# Patient Record
Sex: MALE | Race: WHITE | NOT HISPANIC OR LATINO | URBAN - METROPOLITAN AREA
[De-identification: names, ages, dates, MRNs, and addresses within clinical notes are randomized per-mention and may not be internally consistent; named-entity substitution may affect disease eponyms.]

---

## 2021-05-28 ENCOUNTER — RECORDS - HEALTHEAST (OUTPATIENT)
Dept: ADMINISTRATIVE | Facility: CLINIC | Age: 61
End: 2021-05-28

## 2021-06-01 ENCOUNTER — RECORDS - HEALTHEAST (OUTPATIENT)
Dept: ADMINISTRATIVE | Facility: CLINIC | Age: 61
End: 2021-06-01

## 2021-06-16 PROBLEM — F17.200 TOBACCO USE DISORDER: Status: ACTIVE | Noted: 2019-08-29

## 2023-01-01 ENCOUNTER — LAB REQUISITION (OUTPATIENT)
Dept: LAB | Facility: CLINIC | Age: 63
End: 2023-01-01
Payer: COMMERCIAL

## 2023-01-01 DIAGNOSIS — R53.1 WEAKNESS: ICD-10-CM

## 2023-01-01 DIAGNOSIS — J44.9 CHRONIC OBSTRUCTIVE PULMONARY DISEASE, UNSPECIFIED (H): ICD-10-CM

## 2023-01-01 DIAGNOSIS — R53.83 OTHER FATIGUE: ICD-10-CM

## 2023-01-01 LAB
ALBUMIN UR-MCNC: NEGATIVE MG/DL
ANION GAP SERPL CALCULATED.3IONS-SCNC: 15 MMOL/L (ref 7–15)
APPEARANCE UR: CLEAR
BACTERIA UR CULT: NO GROWTH
BASOPHILS # BLD AUTO: 0.1 10E3/UL (ref 0–0.2)
BASOPHILS NFR BLD AUTO: 1 %
BILIRUB UR QL STRIP: NEGATIVE
BUN SERPL-MCNC: 25 MG/DL (ref 8–23)
CALCIUM SERPL-MCNC: 8.7 MG/DL (ref 8.8–10.2)
CHLORIDE SERPL-SCNC: 103 MMOL/L (ref 98–107)
COLOR UR AUTO: NORMAL
CREAT SERPL-MCNC: 1.07 MG/DL (ref 0.67–1.17)
DEPRECATED HCO3 PLAS-SCNC: 23 MMOL/L (ref 22–29)
EOSINOPHIL # BLD AUTO: 0 10E3/UL (ref 0–0.7)
EOSINOPHIL NFR BLD AUTO: 0 %
ERYTHROCYTE [DISTWIDTH] IN BLOOD BY AUTOMATED COUNT: 12.4 % (ref 10–15)
GFR SERPL CREATININE-BSD FRML MDRD: 79 ML/MIN/1.73M2
GLUCOSE SERPL-MCNC: 135 MG/DL (ref 70–99)
GLUCOSE UR STRIP-MCNC: NEGATIVE MG/DL
HCT VFR BLD AUTO: 41.6 % (ref 40–53)
HGB BLD-MCNC: 13.3 G/DL (ref 13.3–17.7)
HGB UR QL STRIP: NEGATIVE
IMM GRANULOCYTES # BLD: 0.1 10E3/UL
IMM GRANULOCYTES NFR BLD: 1 %
KETONES UR STRIP-MCNC: NEGATIVE MG/DL
LEUKOCYTE ESTERASE UR QL STRIP: NEGATIVE
LYMPHOCYTES # BLD AUTO: 1.6 10E3/UL (ref 0.8–5.3)
LYMPHOCYTES NFR BLD AUTO: 18 %
MCH RBC QN AUTO: 32.4 PG (ref 26.5–33)
MCHC RBC AUTO-ENTMCNC: 32 G/DL (ref 31.5–36.5)
MCV RBC AUTO: 101 FL (ref 78–100)
MONOCYTES # BLD AUTO: 1.5 10E3/UL (ref 0–1.3)
MONOCYTES NFR BLD AUTO: 18 %
NEUTROPHILS # BLD AUTO: 5.2 10E3/UL (ref 1.6–8.3)
NEUTROPHILS NFR BLD AUTO: 62 %
NITRATE UR QL: NEGATIVE
NRBC # BLD AUTO: 0 10E3/UL
NRBC BLD AUTO-RTO: 0 /100
NT-PROBNP SERPL-MCNC: 322 PG/ML (ref 0–900)
PH UR STRIP: 5.5 [PH] (ref 5–7)
PLATELET # BLD AUTO: 308 10E3/UL (ref 150–450)
POTASSIUM SERPL-SCNC: 3.8 MMOL/L (ref 3.4–5.3)
RBC # BLD AUTO: 4.11 10E6/UL (ref 4.4–5.9)
RBC URINE: 0 /HPF
SODIUM SERPL-SCNC: 141 MMOL/L (ref 136–145)
SP GR UR STRIP: 1.02 (ref 1–1.03)
SQUAMOUS EPITHELIAL: <1 /HPF
TRANSITIONAL EPI: <1 /HPF
UROBILINOGEN UR STRIP-MCNC: NORMAL MG/DL
WBC # BLD AUTO: 8.4 10E3/UL (ref 4–11)
WBC URINE: 0 /HPF

## 2023-01-01 PROCEDURE — 83880 ASSAY OF NATRIURETIC PEPTIDE: CPT | Mod: ORL | Performed by: NURSE PRACTITIONER

## 2023-01-01 PROCEDURE — 80048 BASIC METABOLIC PNL TOTAL CA: CPT | Mod: ORL | Performed by: NURSE PRACTITIONER

## 2023-01-01 PROCEDURE — 81001 URINALYSIS AUTO W/SCOPE: CPT | Mod: ORL | Performed by: NURSE PRACTITIONER

## 2023-01-01 PROCEDURE — 85025 COMPLETE CBC W/AUTO DIFF WBC: CPT | Mod: ORL | Performed by: NURSE PRACTITIONER

## 2023-01-01 PROCEDURE — 87086 URINE CULTURE/COLONY COUNT: CPT | Mod: ORL | Performed by: NURSE PRACTITIONER

## 2023-01-01 PROCEDURE — P9603 ONE-WAY ALLOW PRORATED MILES: HCPCS | Mod: ORL | Performed by: NURSE PRACTITIONER

## 2023-01-01 PROCEDURE — 36415 COLL VENOUS BLD VENIPUNCTURE: CPT | Mod: ORL | Performed by: NURSE PRACTITIONER

## 2024-01-01 ENCOUNTER — APPOINTMENT (OUTPATIENT)
Dept: NEUROLOGY | Facility: CLINIC | Age: 64
DRG: 003 | End: 2024-01-01
Attending: NURSE PRACTITIONER
Payer: COMMERCIAL

## 2024-01-01 ENCOUNTER — APPOINTMENT (OUTPATIENT)
Dept: GENERAL RADIOLOGY | Facility: CLINIC | Age: 64
DRG: 003 | End: 2024-01-01
Attending: NURSE PRACTITIONER
Payer: COMMERCIAL

## 2024-01-01 ENCOUNTER — APPOINTMENT (OUTPATIENT)
Dept: ULTRASOUND IMAGING | Facility: CLINIC | Age: 64
DRG: 003 | End: 2024-01-01
Attending: NURSE PRACTITIONER
Payer: COMMERCIAL

## 2024-01-01 ENCOUNTER — HOSPITAL ENCOUNTER (INPATIENT)
Facility: CLINIC | Age: 64
LOS: 2 days | DRG: 003 | End: 2024-02-20
Admitting: INTERNAL MEDICINE
Payer: COMMERCIAL

## 2024-01-01 ENCOUNTER — APPOINTMENT (OUTPATIENT)
Dept: CT IMAGING | Facility: CLINIC | Age: 64
DRG: 003 | End: 2024-01-01
Attending: INTERNAL MEDICINE
Payer: COMMERCIAL

## 2024-01-01 ENCOUNTER — APPOINTMENT (OUTPATIENT)
Dept: GENERAL RADIOLOGY | Facility: CLINIC | Age: 64
DRG: 003 | End: 2024-01-01
Attending: STUDENT IN AN ORGANIZED HEALTH CARE EDUCATION/TRAINING PROGRAM
Payer: COMMERCIAL

## 2024-01-01 ENCOUNTER — APPOINTMENT (OUTPATIENT)
Dept: CT IMAGING | Facility: CLINIC | Age: 64
DRG: 003 | End: 2024-01-01
Attending: NURSE PRACTITIONER
Payer: COMMERCIAL

## 2024-01-01 ENCOUNTER — APPOINTMENT (OUTPATIENT)
Dept: CARDIOLOGY | Facility: CLINIC | Age: 64
DRG: 003 | End: 2024-01-01
Attending: NURSE PRACTITIONER
Payer: COMMERCIAL

## 2024-01-01 VITALS
SYSTOLIC BLOOD PRESSURE: 109 MMHG | HEIGHT: 69 IN | TEMPERATURE: 99 F | BODY MASS INDEX: 26.12 KG/M2 | RESPIRATION RATE: 12 BRPM | WEIGHT: 176.37 LBS | DIASTOLIC BLOOD PRESSURE: 96 MMHG | OXYGEN SATURATION: 93 %

## 2024-01-01 DIAGNOSIS — I46.9 CARDIAC ARREST (H): ICD-10-CM

## 2024-01-01 LAB
ABO/RH(D): NORMAL
ABO/RH(D): NORMAL
ACT BLD: 166 SECONDS (ref 74–150)
ACT BLD: 170 SECONDS (ref 74–150)
ACT BLD: 174 SECONDS (ref 74–150)
ACT BLD: 178 SECONDS (ref 74–150)
ACT BLD: 197 SECONDS (ref 74–150)
ACT BLD: 197 SECONDS (ref 74–150)
ACT BLD: 201 SECONDS (ref 74–150)
ACT BLD: 201 SECONDS (ref 74–150)
ACT BLD: 205 SECONDS (ref 74–150)
ACT BLD: 209 SECONDS (ref 74–150)
ACT BLD: 213 SECONDS (ref 74–150)
ACT BLD: 217 SECONDS (ref 74–150)
ACT BLD: 217 SECONDS (ref 74–150)
ACT BLD: 220 SECONDS (ref 74–150)
ACT BLD: 224 SECONDS (ref 74–150)
ACT BLD: 228 SECONDS (ref 74–150)
ACT BLD: 348 SECONDS (ref 74–150)
ALBUMIN SERPL BCG-MCNC: 2.8 G/DL (ref 3.5–5.2)
ALBUMIN SERPL BCG-MCNC: 3 G/DL (ref 3.5–5.2)
ALBUMIN SERPL BCG-MCNC: 3.1 G/DL (ref 3.5–5.2)
ALBUMIN SERPL BCG-MCNC: 3.3 G/DL (ref 3.5–5.2)
ALBUMIN SERPL BCG-MCNC: 3.3 G/DL (ref 3.5–5.2)
ALBUMIN SERPL BCG-MCNC: 3.6 G/DL (ref 3.5–5.2)
ALBUMIN SERPL BCG-MCNC: 3.7 G/DL (ref 3.5–5.2)
ALBUMIN SERPL BCG-MCNC: 3.8 G/DL (ref 3.5–5.2)
ALBUMIN UR-MCNC: 100 MG/DL
ALLEN'S TEST: ABNORMAL
ALP SERPL-CCNC: 101 U/L (ref 40–150)
ALP SERPL-CCNC: 110 U/L (ref 40–150)
ALP SERPL-CCNC: 115 U/L (ref 40–150)
ALP SERPL-CCNC: 139 U/L (ref 40–150)
ALP SERPL-CCNC: 155 U/L (ref 40–150)
ALP SERPL-CCNC: 157 U/L (ref 40–150)
ALP SERPL-CCNC: 165 U/L (ref 40–150)
ALP SERPL-CCNC: 174 U/L (ref 40–150)
ALT SERPL W P-5'-P-CCNC: 19 U/L (ref 0–70)
ALT SERPL W P-5'-P-CCNC: 21 U/L (ref 0–70)
ALT SERPL W P-5'-P-CCNC: 217 U/L (ref 0–70)
ALT SERPL W P-5'-P-CCNC: 22 U/L (ref 0–70)
ALT SERPL W P-5'-P-CCNC: 26 U/L (ref 0–70)
ALT SERPL W P-5'-P-CCNC: 42 U/L (ref 0–70)
ALT SERPL W P-5'-P-CCNC: 58 U/L (ref 0–70)
ALT SERPL W P-5'-P-CCNC: 78 U/L (ref 0–70)
AMPHETAMINES UR QL SCN: ABNORMAL
ANION GAP SERPL CALCULATED.3IONS-SCNC: 14 MMOL/L (ref 7–15)
ANION GAP SERPL CALCULATED.3IONS-SCNC: 17 MMOL/L (ref 7–15)
ANION GAP SERPL CALCULATED.3IONS-SCNC: 19 MMOL/L (ref 7–15)
ANION GAP SERPL CALCULATED.3IONS-SCNC: 20 MMOL/L (ref 7–15)
ANION GAP SERPL CALCULATED.3IONS-SCNC: 27 MMOL/L (ref 7–15)
ANTIBODY SCREEN: NEGATIVE
ANTIBODY SCREEN: NEGATIVE
APPEARANCE UR: CLEAR
APTT PPP: 104 SECONDS (ref 22–38)
APTT PPP: 118 SECONDS (ref 22–38)
APTT PPP: 57 SECONDS (ref 22–38)
APTT PPP: 67 SECONDS (ref 22–38)
APTT PPP: 73 SECONDS (ref 22–38)
APTT PPP: 81 SECONDS (ref 22–38)
APTT PPP: 87 SECONDS (ref 22–38)
APTT PPP: >240 SECONDS (ref 22–38)
AST SERPL W P-5'-P-CCNC: 104 U/L (ref 0–45)
AST SERPL W P-5'-P-CCNC: 156 U/L (ref 0–45)
AST SERPL W P-5'-P-CCNC: 157 U/L (ref 0–45)
AST SERPL W P-5'-P-CCNC: 1578 U/L (ref 0–45)
AST SERPL W P-5'-P-CCNC: 184 U/L (ref 0–45)
AST SERPL W P-5'-P-CCNC: 412 U/L (ref 0–45)
AST SERPL W P-5'-P-CCNC: 692 U/L (ref 0–45)
AST SERPL W P-5'-P-CCNC: 988 U/L (ref 0–45)
AT III ACT/NOR PPP CHRO: 47 % (ref 85–135)
AT III ACT/NOR PPP CHRO: 56 % (ref 85–135)
ATRIAL RATE - MUSE: 107 BPM
BARBITURATES UR QL SCN: ABNORMAL
BASE EXCESS BLDA CALC-SCNC: -1.1 MMOL/L (ref -3–3)
BASE EXCESS BLDA CALC-SCNC: -1.2 MMOL/L (ref -3–3)
BASE EXCESS BLDA CALC-SCNC: -2.5 MMOL/L (ref -3–3)
BASE EXCESS BLDA CALC-SCNC: -2.7 MMOL/L (ref -3–3)
BASE EXCESS BLDA CALC-SCNC: -3.1 MMOL/L (ref -3–3)
BASE EXCESS BLDA CALC-SCNC: 2.9 MMOL/L (ref -3–3)
BASE EXCESS BLDA CALC-SCNC: 3.1 MMOL/L (ref -3–3)
BASE EXCESS BLDA CALC-SCNC: 3.1 MMOL/L (ref -3–3)
BASE EXCESS BLDA CALC-SCNC: 3.2 MMOL/L (ref -3–3)
BASE EXCESS BLDA CALC-SCNC: 4 MMOL/L (ref -3–3)
BASE EXCESS BLDA CALC-SCNC: 4.4 MMOL/L (ref -3–3)
BASE EXCESS BLDA CALC-SCNC: 4.9 MMOL/L (ref -3–3)
BASE EXCESS BLDA CALC-SCNC: 5.5 MMOL/L (ref -3–3)
BASE EXCESS BLDA CALC-SCNC: 6.7 MMOL/L (ref -3–3)
BASE EXCESS BLDA CALC-SCNC: 7.1 MMOL/L (ref -3–3)
BASE EXCESS BLDA CALC-SCNC: 7.1 MMOL/L (ref -3–3)
BASE EXCESS BLDA CALC-SCNC: 7.3 MMOL/L (ref -3–3)
BASE EXCESS BLDV CALC-SCNC: -0.4 MMOL/L (ref -3–3)
BASE EXCESS BLDV CALC-SCNC: 3.3 MMOL/L (ref -3–3)
BASE EXCESS BLDV CALC-SCNC: 3.8 MMOL/L (ref -3–3)
BASE EXCESS BLDV CALC-SCNC: 5.1 MMOL/L (ref -3–3)
BASE EXCESS BLDV CALC-SCNC: 5.8 MMOL/L (ref -3–3)
BASOPHILS # BLD AUTO: 0.1 10E3/UL (ref 0–0.2)
BASOPHILS NFR BLD AUTO: 0 %
BENZODIAZ UR QL SCN: ABNORMAL
BILIRUB SERPL-MCNC: 0.6 MG/DL
BILIRUB SERPL-MCNC: 0.6 MG/DL
BILIRUB SERPL-MCNC: 0.8 MG/DL
BILIRUB SERPL-MCNC: 1.1 MG/DL
BILIRUB SERPL-MCNC: 1.6 MG/DL
BILIRUB SERPL-MCNC: 1.8 MG/DL
BILIRUB SERPL-MCNC: 2 MG/DL
BILIRUB SERPL-MCNC: 2.6 MG/DL
BILIRUB UR QL STRIP: NEGATIVE
BLD PROD TYP BPU: NORMAL
BLOOD COMPONENT TYPE: NORMAL
BUN SERPL-MCNC: 11.1 MG/DL (ref 8–23)
BUN SERPL-MCNC: 16.9 MG/DL (ref 8–23)
BUN SERPL-MCNC: 21.1 MG/DL (ref 8–23)
BUN SERPL-MCNC: 24.6 MG/DL (ref 8–23)
BUN SERPL-MCNC: 27.6 MG/DL (ref 8–23)
BUN SERPL-MCNC: 30.8 MG/DL (ref 8–23)
BUN SERPL-MCNC: 36.5 MG/DL (ref 8–23)
BUN SERPL-MCNC: 40.2 MG/DL (ref 8–23)
BZE UR QL SCN: ABNORMAL
CA-I BLD-MCNC: 3.9 MG/DL (ref 4.4–5.2)
CA-I BLD-MCNC: 4 MG/DL (ref 4.4–5.2)
CA-I BLD-MCNC: 4 MG/DL (ref 4.4–5.2)
CA-I BLD-MCNC: 4.1 MG/DL (ref 4.4–5.2)
CA-I BLD-MCNC: 4.4 MG/DL (ref 4.4–5.2)
CA-I BLD-MCNC: 4.6 MG/DL (ref 4.4–5.2)
CA-I BLD-MCNC: 4.7 MG/DL (ref 4.4–5.2)
CA-I BLD-MCNC: 4.8 MG/DL (ref 4.4–5.2)
CALCIUM SERPL-MCNC: 8 MG/DL (ref 8.8–10.2)
CALCIUM SERPL-MCNC: 8.2 MG/DL (ref 8.8–10.2)
CALCIUM SERPL-MCNC: 8.4 MG/DL (ref 8.2–9.6)
CALCIUM SERPL-MCNC: 8.5 MG/DL (ref 8.8–10.2)
CALCIUM SERPL-MCNC: 8.6 MG/DL (ref 8.8–10.2)
CALCIUM SERPL-MCNC: 8.9 MG/DL (ref 8.8–10.2)
CALCIUM SERPL-MCNC: 9.2 MG/DL (ref 8.8–10.2)
CALCIUM SERPL-MCNC: 9.5 MG/DL (ref 8.8–10.2)
CANNABINOIDS UR QL SCN: ABNORMAL
CF REDUC 30M P MA P HEP LENFR BLD TEG: 0 % (ref 0–8)
CF REDUC 60M P MA P HEPASE LENFR BLD TEG: 0 % (ref 0–15)
CFT P HPASE BLD TEG: 1.2 MINUTE (ref 1–3)
CFT P HPASE BLD TEG: 2.6 MINUTE (ref 1–3)
CFT P HPASE BLD TEG: 3.2 MINUTE (ref 1–3)
CHLORIDE SERPL-SCNC: 100 MMOL/L (ref 98–107)
CHLORIDE SERPL-SCNC: 101 MMOL/L (ref 98–107)
CHLORIDE SERPL-SCNC: 103 MMOL/L (ref 98–107)
CHLORIDE SERPL-SCNC: 92 MMOL/L (ref 98–107)
CHLORIDE SERPL-SCNC: 98 MMOL/L (ref 98–107)
CHLORIDE SERPL-SCNC: 99 MMOL/L (ref 98–107)
CI (COAGULATION INDEX)(Z): -2.1 (ref -3–3)
CI (COAGULATION INDEX)(Z): -5.3 (ref -3–3)
CI (COAGULATION INDEX)(Z): 3 (ref -3–3)
CLOT ANGLE P HPASE BLD TEG: 53.9 DEGREES (ref 53–72)
CLOT ANGLE P HPASE BLD TEG: 57.5 DEGREES (ref 53–72)
CLOT ANGLE P HPASE BLD TEG: 73.4 DEGREES (ref 53–72)
CLOT INIT P HPASE BLD TEG: 13.8 MINUTE (ref 5–10)
CLOT INIT P HPASE BLD TEG: 5.2 MINUTE (ref 5–10)
CLOT INIT P HPASE BLD TEG: 9.6 MINUTE (ref 5–10)
CLOT STRENGTH P HPASE BLD TEG: 11.6 KD/SC (ref 4.5–11)
CLOT STRENGTH P HPASE BLD TEG: 12 KD/SC (ref 4.5–11)
CLOT STRENGTH P HPASE BLD TEG: 13.8 KD/SC (ref 4.5–11)
CODING SYSTEM: NORMAL
COHGB MFR BLD: 93 % (ref 96–97)
COHGB MFR BLD: 98.3 % (ref 96–97)
COHGB MFR BLD: 98.9 % (ref 96–97)
COHGB MFR BLD: 99.2 % (ref 96–97)
COHGB MFR BLD: 99.6 % (ref 96–97)
COHGB MFR BLD: >100 % (ref 96–97)
COLOR UR AUTO: YELLOW
CORTIS SERPL-MCNC: 27.7 UG/DL
CREAT SERPL-MCNC: 0.59 MG/DL (ref 0.67–1.17)
CREAT SERPL-MCNC: 0.66 MG/DL (ref 0.67–1.17)
CREAT SERPL-MCNC: 0.79 MG/DL (ref 0.67–1.17)
CREAT SERPL-MCNC: 0.91 MG/DL (ref 0.67–1.17)
CREAT SERPL-MCNC: 1.17 MG/DL (ref 0.67–1.17)
CREAT SERPL-MCNC: 1.29 MG/DL (ref 0.67–1.17)
CREAT SERPL-MCNC: 1.46 MG/DL (ref 0.67–1.17)
CREAT SERPL-MCNC: 1.73 MG/DL (ref 0.67–1.17)
CROSSMATCH: NORMAL
CRP SERPL-MCNC: 248 MG/L
CRP SERPL-MCNC: 32.6 MG/L
CRP SERPL-MCNC: 56.7 MG/L
D DIMER PPP FEU-MCNC: 12.33 UG/ML FEU (ref 0–0.5)
D DIMER PPP FEU-MCNC: 5.25 UG/ML FEU (ref 0–0.5)
D DIMER PPP FEU-MCNC: 6 UG/ML FEU (ref 0–0.5)
D DIMER PPP FEU-MCNC: >20 UG/ML FEU (ref 0–0.5)
DEPRECATED HCO3 PLAS-SCNC: 20 MMOL/L (ref 22–29)
DEPRECATED HCO3 PLAS-SCNC: 22 MMOL/L (ref 22–29)
DEPRECATED HCO3 PLAS-SCNC: 24 MMOL/L (ref 22–29)
DEPRECATED HCO3 PLAS-SCNC: 24 MMOL/L (ref 22–29)
DEPRECATED HCO3 PLAS-SCNC: 25 MMOL/L (ref 22–29)
DEPRECATED HCO3 PLAS-SCNC: 27 MMOL/L (ref 22–29)
DEPRECATED HCO3 PLAS-SCNC: 28 MMOL/L (ref 22–29)
DEPRECATED HCO3 PLAS-SCNC: 29 MMOL/L (ref 22–29)
DIASTOLIC BLOOD PRESSURE - MUSE: NORMAL MMHG
EGFRCR SERPLBLD CKD-EPI 2021: 44 ML/MIN/1.73M2
EGFRCR SERPLBLD CKD-EPI 2021: 54 ML/MIN/1.73M2
EGFRCR SERPLBLD CKD-EPI 2021: 63 ML/MIN/1.73M2
EGFRCR SERPLBLD CKD-EPI 2021: 71 ML/MIN/1.73M2
EGFRCR SERPLBLD CKD-EPI 2021: 72 ML/MIN/1.73M2
EGFRCR SERPLBLD CKD-EPI 2021: >90 ML/MIN/1.73M2
EOSINOPHIL # BLD AUTO: 0.1 10E3/UL (ref 0–0.7)
EOSINOPHIL NFR BLD AUTO: 0 %
ERYTHROCYTE [DISTWIDTH] IN BLOOD BY AUTOMATED COUNT: 19.1 % (ref 10–15)
ERYTHROCYTE [DISTWIDTH] IN BLOOD BY AUTOMATED COUNT: 19.3 % (ref 10–15)
ERYTHROCYTE [DISTWIDTH] IN BLOOD BY AUTOMATED COUNT: 19.4 % (ref 10–15)
ERYTHROCYTE [DISTWIDTH] IN BLOOD BY AUTOMATED COUNT: 19.4 % (ref 10–15)
ERYTHROCYTE [DISTWIDTH] IN BLOOD BY AUTOMATED COUNT: 19.8 % (ref 10–15)
ERYTHROCYTE [DISTWIDTH] IN BLOOD BY AUTOMATED COUNT: 19.9 % (ref 10–15)
ERYTHROCYTE [DISTWIDTH] IN BLOOD BY AUTOMATED COUNT: 20.4 % (ref 10–15)
ERYTHROCYTE [DISTWIDTH] IN BLOOD BY AUTOMATED COUNT: 20.6 % (ref 10–15)
ERYTHROCYTE [SEDIMENTATION RATE] IN BLOOD BY WESTERGREN METHOD: 43 MM/HR (ref 0–20)
ERYTHROCYTE [SEDIMENTATION RATE] IN BLOOD BY WESTERGREN METHOD: 74 MM/HR (ref 0–20)
ERYTHROCYTE [SEDIMENTATION RATE] IN BLOOD BY WESTERGREN METHOD: 84 MM/HR (ref 0–20)
FENTANYL UR QL: ABNORMAL
FIBRINOGEN PPP-MCNC: 423 MG/DL (ref 170–490)
FIBRINOGEN PPP-MCNC: 477 MG/DL (ref 170–490)
FIBRINOGEN PPP-MCNC: 535 MG/DL (ref 170–490)
FIBRINOGEN PPP-MCNC: 618 MG/DL (ref 170–490)
FLUAV RNA SPEC QL NAA+PROBE: NEGATIVE
FLUBV RNA RESP QL NAA+PROBE: NEGATIVE
GLUCOSE BLD-MCNC: 122 MG/DL (ref 70–99)
GLUCOSE BLD-MCNC: 145 MG/DL (ref 70–99)
GLUCOSE BLD-MCNC: 152 MG/DL (ref 70–99)
GLUCOSE BLD-MCNC: 206 MG/DL (ref 70–99)
GLUCOSE BLD-MCNC: 212 MG/DL (ref 70–99)
GLUCOSE BLD-MCNC: 230 MG/DL (ref 70–99)
GLUCOSE BLD-MCNC: 329 MG/DL (ref 70–99)
GLUCOSE BLD-MCNC: 412 MG/DL (ref 70–99)
GLUCOSE BLD-MCNC: 97 MG/DL (ref 70–99)
GLUCOSE BLDC GLUCOMTR-MCNC: 105 MG/DL (ref 70–99)
GLUCOSE BLDC GLUCOMTR-MCNC: 109 MG/DL (ref 70–99)
GLUCOSE BLDC GLUCOMTR-MCNC: 114 MG/DL (ref 70–99)
GLUCOSE BLDC GLUCOMTR-MCNC: 115 MG/DL (ref 70–99)
GLUCOSE BLDC GLUCOMTR-MCNC: 117 MG/DL (ref 70–99)
GLUCOSE BLDC GLUCOMTR-MCNC: 121 MG/DL (ref 70–99)
GLUCOSE BLDC GLUCOMTR-MCNC: 126 MG/DL (ref 70–99)
GLUCOSE BLDC GLUCOMTR-MCNC: 127 MG/DL (ref 70–99)
GLUCOSE BLDC GLUCOMTR-MCNC: 127 MG/DL (ref 70–99)
GLUCOSE BLDC GLUCOMTR-MCNC: 134 MG/DL (ref 70–99)
GLUCOSE BLDC GLUCOMTR-MCNC: 146 MG/DL (ref 70–99)
GLUCOSE BLDC GLUCOMTR-MCNC: 147 MG/DL (ref 70–99)
GLUCOSE BLDC GLUCOMTR-MCNC: 153 MG/DL (ref 70–99)
GLUCOSE BLDC GLUCOMTR-MCNC: 159 MG/DL (ref 70–99)
GLUCOSE BLDC GLUCOMTR-MCNC: 175 MG/DL (ref 70–99)
GLUCOSE BLDC GLUCOMTR-MCNC: 176 MG/DL (ref 70–99)
GLUCOSE BLDC GLUCOMTR-MCNC: 182 MG/DL (ref 70–99)
GLUCOSE BLDC GLUCOMTR-MCNC: 183 MG/DL (ref 70–99)
GLUCOSE BLDC GLUCOMTR-MCNC: 191 MG/DL (ref 70–99)
GLUCOSE BLDC GLUCOMTR-MCNC: 193 MG/DL (ref 70–99)
GLUCOSE BLDC GLUCOMTR-MCNC: 194 MG/DL (ref 70–99)
GLUCOSE BLDC GLUCOMTR-MCNC: 195 MG/DL (ref 70–99)
GLUCOSE BLDC GLUCOMTR-MCNC: 204 MG/DL (ref 70–99)
GLUCOSE BLDC GLUCOMTR-MCNC: 216 MG/DL (ref 70–99)
GLUCOSE BLDC GLUCOMTR-MCNC: 233 MG/DL (ref 70–99)
GLUCOSE BLDC GLUCOMTR-MCNC: 269 MG/DL (ref 70–99)
GLUCOSE BLDC GLUCOMTR-MCNC: 271 MG/DL (ref 70–99)
GLUCOSE BLDC GLUCOMTR-MCNC: 274 MG/DL (ref 70–99)
GLUCOSE BLDC GLUCOMTR-MCNC: 95 MG/DL (ref 70–99)
GLUCOSE SERPL-MCNC: 102 MG/DL (ref 70–99)
GLUCOSE SERPL-MCNC: 127 MG/DL (ref 70–99)
GLUCOSE SERPL-MCNC: 146 MG/DL (ref 70–99)
GLUCOSE SERPL-MCNC: 161 MG/DL (ref 70–99)
GLUCOSE SERPL-MCNC: 220 MG/DL (ref 70–99)
GLUCOSE SERPL-MCNC: 230 MG/DL (ref 70–99)
GLUCOSE SERPL-MCNC: 242 MG/DL (ref 70–99)
GLUCOSE SERPL-MCNC: 316 MG/DL (ref 70–99)
GLUCOSE UR STRIP-MCNC: 150 MG/DL
HBA1C MFR BLD: 7.2 %
HCO3 BLD-SCNC: 22 MMOL/L (ref 21–28)
HCO3 BLD-SCNC: 23 MMOL/L (ref 21–28)
HCO3 BLD-SCNC: 24 MMOL/L (ref 21–28)
HCO3 BLD-SCNC: 24 MMOL/L (ref 21–28)
HCO3 BLD-SCNC: 27 MMOL/L (ref 21–28)
HCO3 BLD-SCNC: 27 MMOL/L (ref 21–28)
HCO3 BLD-SCNC: 28 MMOL/L (ref 21–28)
HCO3 BLD-SCNC: 30 MMOL/L (ref 21–28)
HCO3 BLD-SCNC: 30 MMOL/L (ref 21–28)
HCO3 BLD-SCNC: 31 MMOL/L (ref 21–28)
HCO3 BLDA-SCNC: 24 MMOL/L (ref 21–28)
HCO3 BLDA-SCNC: 27 MMOL/L (ref 21–28)
HCO3 BLDA-SCNC: 27 MMOL/L (ref 21–28)
HCO3 BLDA-SCNC: 28 MMOL/L (ref 21–28)
HCO3 BLDV-SCNC: 25 MMOL/L (ref 21–28)
HCO3 BLDV-SCNC: 29 MMOL/L (ref 21–28)
HCO3 BLDV-SCNC: 29 MMOL/L (ref 21–28)
HCO3 BLDV-SCNC: 31 MMOL/L (ref 21–28)
HCO3 BLDV-SCNC: 32 MMOL/L (ref 21–28)
HCT VFR BLD AUTO: 20.3 % (ref 40–53)
HCT VFR BLD AUTO: 21 % (ref 40–53)
HCT VFR BLD AUTO: 21.5 % (ref 40–53)
HCT VFR BLD AUTO: 22.4 % (ref 40–53)
HCT VFR BLD AUTO: 24 % (ref 40–53)
HCT VFR BLD AUTO: 24.7 % (ref 40–53)
HCT VFR BLD AUTO: 25 % (ref 40–53)
HCT VFR BLD AUTO: 35.2 % (ref 40–53)
HGB BLD-MCNC: 10.8 G/DL (ref 13.3–17.7)
HGB BLD-MCNC: 12.4 G/DL (ref 13.3–17.7)
HGB BLD-MCNC: 6.4 G/DL (ref 13.3–17.7)
HGB BLD-MCNC: 6.4 G/DL (ref 13.3–17.7)
HGB BLD-MCNC: 6.9 G/DL (ref 13.3–17.7)
HGB BLD-MCNC: 7 G/DL (ref 13.3–17.7)
HGB BLD-MCNC: 7.1 G/DL (ref 13.3–17.7)
HGB BLD-MCNC: 7.7 G/DL (ref 13.3–17.7)
HGB BLD-MCNC: 8.2 G/DL (ref 13.3–17.7)
HGB BLD-MCNC: 8.4 G/DL (ref 13.3–17.7)
HGB FREE PLAS-MCNC: 30 MG/DL
HGB FREE PLAS-MCNC: 30 MG/DL
HGB FREE PLAS-MCNC: <30 MG/DL
HGB UR QL STRIP: ABNORMAL
IMM GRANULOCYTES # BLD: 0.5 10E3/UL
IMM GRANULOCYTES NFR BLD: 2 %
INR PPP: 1.76 (ref 0.85–1.15)
INR PPP: 2.03 (ref 0.85–1.15)
INR PPP: 2.05 (ref 0.85–1.15)
INR PPP: 2.11 (ref 0.85–1.15)
INR PPP: 2.16 (ref 0.85–1.15)
INR PPP: 2.22 (ref 0.85–1.15)
INR PPP: 2.73 (ref 0.85–1.15)
INR PPP: 2.8 (ref 0.85–1.15)
INTERPRETATION ECG - MUSE: NORMAL
INTERPRETATION TEGPIA: NORMAL
INTERPRETATION TEGPIA: NORMAL
ISSUE DATE AND TIME: NORMAL
KETONES UR STRIP-MCNC: NEGATIVE MG/DL
LACTATE BLD-SCNC: 16 MMOL/L (ref 0.7–2)
LACTATE SERPL-SCNC: 11.1 MMOL/L (ref 0.7–2)
LACTATE SERPL-SCNC: 4.3 MMOL/L (ref 0.7–2)
LACTATE SERPL-SCNC: 4.8 MMOL/L (ref 0.7–2)
LACTATE SERPL-SCNC: 5 MMOL/L (ref 0.7–2)
LACTATE SERPL-SCNC: 5.4 MMOL/L (ref 0.7–2)
LACTATE SERPL-SCNC: 5.5 MMOL/L (ref 0.7–2)
LACTATE SERPL-SCNC: 5.5 MMOL/L (ref 0.7–2)
LACTATE SERPL-SCNC: 5.6 MMOL/L (ref 0.7–2)
LACTATE SERPL-SCNC: 5.8 MMOL/L (ref 0.7–2)
LACTATE SERPL-SCNC: 6.2 MMOL/L (ref 0.7–2)
LACTATE SERPL-SCNC: 6.3 MMOL/L (ref 0.7–2)
LACTATE SERPL-SCNC: 6.4 MMOL/L (ref 0.7–2)
LACTATE SERPL-SCNC: 6.5 MMOL/L (ref 0.7–2)
LACTATE SERPL-SCNC: 6.6 MMOL/L (ref 0.7–2)
LACTATE SERPL-SCNC: 7 MMOL/L (ref 0.7–2)
LACTATE SERPL-SCNC: 7.5 MMOL/L (ref 0.7–2)
LACTATE SERPL-SCNC: 7.6 MMOL/L (ref 0.7–2)
LACTATE SERPL-SCNC: 8 MMOL/L (ref 0.7–2)
LACTATE SERPL-SCNC: 8.1 MMOL/L (ref 0.7–2)
LDH SERPL L TO P-CCNC: 1649 U/L (ref 0–250)
LDH SERPL L TO P-CCNC: 526 U/L (ref 0–250)
LDH SERPL L TO P-CCNC: 640 U/L (ref 0–250)
LEUKOCYTE ESTERASE UR QL STRIP: NEGATIVE
LYMPHOCYTES # BLD AUTO: 2.2 10E3/UL (ref 0.8–5.3)
LYMPHOCYTES NFR BLD AUTO: 8 %
MAGNESIUM SERPL-MCNC: 2.2 MG/DL (ref 1.7–2.3)
MAGNESIUM SERPL-MCNC: 2.4 MG/DL (ref 1.7–2.3)
MAGNESIUM SERPL-MCNC: 2.8 MG/DL (ref 1.7–2.3)
MAGNESIUM SERPL-MCNC: 3.2 MG/DL (ref 1.7–2.3)
MAGNESIUM SERPL-MCNC: 3.6 MG/DL (ref 1.7–2.3)
MAGNESIUM SERPL-MCNC: 3.6 MG/DL (ref 1.7–2.3)
MAGNESIUM SERPL-MCNC: 3.7 MG/DL (ref 1.7–2.3)
MAGNESIUM SERPL-MCNC: 4.5 MG/DL (ref 1.7–2.3)
MCF P HPASE BLD TEG: 69.9 MM (ref 50–70)
MCF P HPASE BLD TEG: 70.6 MM (ref 50–70)
MCF P HPASE BLD TEG: 73.4 MM (ref 50–70)
MCH RBC QN AUTO: 26 PG (ref 26.5–33)
MCH RBC QN AUTO: 26 PG (ref 26.5–33)
MCH RBC QN AUTO: 26.6 PG (ref 26.5–33)
MCH RBC QN AUTO: 26.7 PG (ref 26.5–33)
MCH RBC QN AUTO: 26.7 PG (ref 26.5–33)
MCH RBC QN AUTO: 27 PG (ref 26.5–33)
MCH RBC QN AUTO: 27.1 PG (ref 26.5–33)
MCH RBC QN AUTO: 28.1 PG (ref 26.5–33)
MCHC RBC AUTO-ENTMCNC: 30.7 G/DL (ref 31.5–36.5)
MCHC RBC AUTO-ENTMCNC: 31.5 G/DL (ref 31.5–36.5)
MCHC RBC AUTO-ENTMCNC: 31.7 G/DL (ref 31.5–36.5)
MCHC RBC AUTO-ENTMCNC: 32.1 G/DL (ref 31.5–36.5)
MCHC RBC AUTO-ENTMCNC: 32.6 G/DL (ref 31.5–36.5)
MCHC RBC AUTO-ENTMCNC: 32.9 G/DL (ref 31.5–36.5)
MCHC RBC AUTO-ENTMCNC: 33.2 G/DL (ref 31.5–36.5)
MCHC RBC AUTO-ENTMCNC: 33.6 G/DL (ref 31.5–36.5)
MCV RBC AUTO: 80 FL (ref 78–100)
MCV RBC AUTO: 81 FL (ref 78–100)
MCV RBC AUTO: 82 FL (ref 78–100)
MCV RBC AUTO: 82 FL (ref 78–100)
MCV RBC AUTO: 83 FL (ref 78–100)
MCV RBC AUTO: 85 FL (ref 78–100)
MCV RBC AUTO: 85 FL (ref 78–100)
MCV RBC AUTO: 87 FL (ref 78–100)
MONOCYTES # BLD AUTO: 1.3 10E3/UL (ref 0–1.3)
MONOCYTES NFR BLD AUTO: 5 %
MRSA DNA SPEC QL NAA+PROBE: NEGATIVE
MUCOUS THREADS #/AREA URNS LPF: PRESENT /LPF
NEUTROPHILS # BLD AUTO: 23.8 10E3/UL (ref 1.6–8.3)
NEUTROPHILS NFR BLD AUTO: 85 %
NITRATE UR QL: NEGATIVE
NRBC # BLD AUTO: 0.1 10E3/UL
NRBC BLD AUTO-RTO: 0 /100
NSE SERPL IA-MCNC: 118.3 NG/ML
NSE SERPL IA-MCNC: 54.6 NG/ML
O2/TOTAL GAS SETTING VFR VENT: 100 %
O2/TOTAL GAS SETTING VFR VENT: 40 %
O2/TOTAL GAS SETTING VFR VENT: 50 %
O2/TOTAL GAS SETTING VFR VENT: 60 %
O2/TOTAL GAS SETTING VFR VENT: 80 %
OPIATES UR QL SCN: ABNORMAL
OXYHGB MFR BLDA: 95 % (ref 92–100)
OXYHGB MFR BLDA: 96 % (ref 75–100)
OXYHGB MFR BLDA: 97 % (ref 75–100)
OXYHGB MFR BLDA: 98 % (ref 75–100)
OXYHGB MFR BLDA: 99 % (ref 75–100)
OXYHGB MFR BLDV: 24 % (ref 70–75)
OXYHGB MFR BLDV: 59 %
OXYHGB MFR BLDV: 67 %
OXYHGB MFR BLDV: 67 %
OXYHGB MFR BLDV: 72 %
P AXIS - MUSE: 75 DEGREES
PA AA BLD-ACNC: 100 %
PA AA BLD-ACNC: 100 %
PA ADP BLD-ACNC: 100 %
PA ADP BLD-ACNC: 97 %
PCO2 BLD: 33 MM HG (ref 35–45)
PCO2 BLD: 34 MM HG (ref 35–45)
PCO2 BLD: 34 MM HG (ref 35–45)
PCO2 BLD: 36 MM HG (ref 35–45)
PCO2 BLD: 37 MM HG (ref 35–45)
PCO2 BLD: 38 MM HG (ref 35–45)
PCO2 BLD: 40 MM HG (ref 35–45)
PCO2 BLD: 40 MM HG (ref 35–45)
PCO2 BLD: 42 MM HG (ref 35–45)
PCO2 BLD: 42 MM HG (ref 35–45)
PCO2 BLD: 44 MM HG (ref 35–45)
PCO2 BLD: 48 MM HG (ref 35–45)
PCO2 BLD: 51 MM HG (ref 35–45)
PCO2 BLDA: 34 MM HG (ref 35–45)
PCO2 BLDA: 36 MM HG (ref 35–45)
PCO2 BLDA: 37 MM HG (ref 35–45)
PCO2 BLDA: 40 MM HG (ref 35–45)
PCO2 BLDA: >98 MM HG (ref 35–45)
PCO2 BLDV: 41 MM HG (ref 40–50)
PCO2 BLDV: 43 MM HG (ref 40–50)
PCO2 BLDV: 46 MM HG (ref 40–50)
PCO2 BLDV: 47 MM HG (ref 40–50)
PCO2 BLDV: 71 MM HG (ref 40–50)
PCP QUAL URINE (ROCHE): ABNORMAL
PEEP: 7 CM H2O
PH BLD: 7.31 [PH] (ref 7.35–7.45)
PH BLD: 7.34 [PH] (ref 7.35–7.45)
PH BLD: 7.35 [PH] (ref 7.35–7.45)
PH BLD: 7.38 [PH] (ref 7.35–7.45)
PH BLD: 7.39 [PH] (ref 7.35–7.45)
PH BLD: 7.43 [PH] (ref 7.35–7.45)
PH BLD: 7.46 [PH] (ref 7.35–7.45)
PH BLD: 7.47 [PH] (ref 7.35–7.45)
PH BLD: 7.48 [PH] (ref 7.35–7.45)
PH BLD: 7.49 [PH] (ref 7.35–7.45)
PH BLD: 7.5 [PH] (ref 7.35–7.45)
PH BLD: 7.51 [PH] (ref 7.35–7.45)
PH BLD: 7.52 [PH] (ref 7.35–7.45)
PH BLD: 7.56 [PH] (ref 7.35–7.45)
PH BLD: 7.57 [PH] (ref 7.35–7.45)
PH BLDA: 6.91 [PH] (ref 7.35–7.45)
PH BLDA: 7.39 [PH] (ref 7.35–7.45)
PH BLDA: 7.47 [PH] (ref 7.35–7.45)
PH BLDA: 7.5 [PH] (ref 7.35–7.45)
PH BLDA: 7.52 [PH] (ref 7.35–7.45)
PH BLDV: 7.26 [PH] (ref 7.32–7.43)
PH BLDV: 7.34 [PH] (ref 7.32–7.43)
PH BLDV: 7.43 [PH] (ref 7.32–7.43)
PH BLDV: 7.43 [PH] (ref 7.32–7.43)
PH BLDV: 7.46 [PH] (ref 7.32–7.43)
PH UR STRIP: 6.5 [PH] (ref 5–7)
PHOSPHATE SERPL-MCNC: 5 MG/DL (ref 2.5–4.5)
PHOSPHATE SERPL-MCNC: 9.9 MG/DL (ref 2.5–4.5)
PLATELET # BLD AUTO: 112 10E3/UL (ref 150–450)
PLATELET # BLD AUTO: 133 10E3/UL (ref 150–450)
PLATELET # BLD AUTO: 143 10E3/UL (ref 150–450)
PLATELET # BLD AUTO: 177 10E3/UL (ref 150–450)
PLATELET # BLD AUTO: 189 10E3/UL (ref 150–450)
PLATELET # BLD AUTO: 209 10E3/UL (ref 150–450)
PLATELET # BLD AUTO: 268 10E3/UL (ref 150–450)
PLATELET # BLD AUTO: 88 10E3/UL (ref 150–450)
PO2 BLD: 112 MM HG (ref 80–105)
PO2 BLD: 112 MM HG (ref 80–105)
PO2 BLD: 118 MM HG (ref 80–105)
PO2 BLD: 147 MM HG (ref 80–105)
PO2 BLD: 151 MM HG (ref 80–105)
PO2 BLD: 158 MM HG (ref 80–105)
PO2 BLD: 206 MM HG (ref 80–105)
PO2 BLD: 248 MM HG (ref 80–105)
PO2 BLD: 280 MM HG (ref 80–105)
PO2 BLD: 289 MM HG (ref 80–105)
PO2 BLD: 298 MM HG (ref 80–105)
PO2 BLD: 352 MM HG (ref 80–105)
PO2 BLD: 355 MM HG (ref 80–105)
PO2 BLD: 361 MM HG (ref 80–105)
PO2 BLD: 66 MM HG (ref 80–105)
PO2 BLDA: 170 MM HG (ref 80–105)
PO2 BLDA: 179 MM HG (ref 80–105)
PO2 BLDA: 193 MM HG (ref 80–105)
PO2 BLDA: 300 MM HG (ref 80–105)
PO2 BLDA: 387 MM HG (ref 80–105)
PO2 BLDV: 22 MM HG (ref 25–47)
PO2 BLDV: 35 MM HG (ref 25–47)
PO2 BLDV: 37 MM HG (ref 25–47)
PO2 BLDV: 40 MM HG (ref 25–47)
PO2 BLDV: 41 MM HG (ref 25–47)
POTASSIUM BLD-SCNC: 2.5 MMOL/L (ref 3.4–5.3)
POTASSIUM BLD-SCNC: 2.5 MMOL/L (ref 3.4–5.3)
POTASSIUM BLD-SCNC: 6.4 MMOL/L (ref 3.4–5.3)
POTASSIUM BLD-SCNC: 6.5 MMOL/L (ref 3.4–5.3)
POTASSIUM BLD-SCNC: 7 MMOL/L (ref 3.4–5.3)
POTASSIUM SERPL-SCNC: 2.8 MMOL/L (ref 3.4–5.3)
POTASSIUM SERPL-SCNC: 3.1 MMOL/L (ref 3.4–5.3)
POTASSIUM SERPL-SCNC: 3.7 MMOL/L (ref 3.4–5.3)
POTASSIUM SERPL-SCNC: 4.4 MMOL/L (ref 3.4–5.3)
POTASSIUM SERPL-SCNC: 5.7 MMOL/L (ref 3.4–5.3)
POTASSIUM SERPL-SCNC: 5.8 MMOL/L (ref 3.4–5.3)
POTASSIUM SERPL-SCNC: 6 MMOL/L (ref 3.4–5.3)
POTASSIUM SERPL-SCNC: 6 MMOL/L (ref 3.4–5.3)
POTASSIUM SERPL-SCNC: 6.2 MMOL/L (ref 3.4–5.3)
POTASSIUM SERPL-SCNC: 6.5 MMOL/L (ref 3.4–5.3)
POTASSIUM SERPL-SCNC: 6.5 MMOL/L (ref 3.4–5.3)
POTASSIUM SERPL-SCNC: 6.8 MMOL/L (ref 3.4–5.3)
POTASSIUM SERPL-SCNC: 7.4 MMOL/L (ref 3.4–5.3)
PR INTERVAL - MUSE: 190 MS
PROCALCITONIN SERPL IA-MCNC: 0.16 NG/ML
PROCALCITONIN SERPL IA-MCNC: 93.8 NG/ML
PROT SERPL-MCNC: 4.7 G/DL (ref 6.4–8.3)
PROT SERPL-MCNC: 4.8 G/DL (ref 6.4–8.3)
PROT SERPL-MCNC: 5.1 G/DL (ref 6.4–8.3)
PROT SERPL-MCNC: 5.1 G/DL (ref 6.4–8.3)
PROT SERPL-MCNC: 5.3 G/DL (ref 6.4–8.3)
PROT SERPL-MCNC: 5.5 G/DL (ref 6.4–8.3)
PROT SERPL-MCNC: 5.5 G/DL (ref 6.4–8.3)
PROT SERPL-MCNC: 5.6 G/DL (ref 6.4–8.3)
QRS DURATION - MUSE: 112 MS
QT - MUSE: 352 MS
QTC - MUSE: 469 MS
R AXIS - MUSE: -76 DEGREES
RADIOLOGIST FLAGS: ABNORMAL
RBC # BLD AUTO: 2.4 10E6/UL (ref 4.4–5.9)
RBC # BLD AUTO: 2.56 10E6/UL (ref 4.4–5.9)
RBC # BLD AUTO: 2.69 10E6/UL (ref 4.4–5.9)
RBC # BLD AUTO: 2.73 10E6/UL (ref 4.4–5.9)
RBC # BLD AUTO: 2.89 10E6/UL (ref 4.4–5.9)
RBC # BLD AUTO: 2.92 10E6/UL (ref 4.4–5.9)
RBC # BLD AUTO: 3.1 10E6/UL (ref 4.4–5.9)
RBC # BLD AUTO: 4.04 10E6/UL (ref 4.4–5.9)
RBC URINE: 32 /HPF
RSV RNA SPEC NAA+PROBE: NEGATIVE
SA TARGET DNA: NEGATIVE
SAO2 % BLDA: 89 % (ref 92–100)
SAO2 % BLDA: 95 % (ref 92–100)
SAO2 % BLDA: 95 % (ref 92–100)
SAO2 % BLDA: 96 % (ref 92–100)
SAO2 % BLDA: 98 % (ref 92–100)
SAO2 % BLDA: 99 % (ref 92–100)
SAO2 % BLDA: 99 % (ref 92–100)
SAO2 % BLDA: >100 % (ref 95–96)
SAO2 % BLDA: >100 % (ref 96–97)
SAO2 % BLDV: 25 % (ref 70–75)
SAO2 % BLDV: 60.8 % (ref 70–75)
SAO2 % BLDV: 69.5 % (ref 70–75)
SAO2 % BLDV: 69.5 % (ref 70–75)
SAO2 % BLDV: 74.3 % (ref 70–75)
SARS-COV-2 RNA RESP QL NAA+PROBE: NEGATIVE
SODIUM BLD-SCNC: 145 MMOL/L (ref 135–145)
SODIUM SERPL-SCNC: 140 MMOL/L (ref 135–145)
SODIUM SERPL-SCNC: 143 MMOL/L (ref 135–145)
SODIUM SERPL-SCNC: 143 MMOL/L (ref 135–145)
SODIUM SERPL-SCNC: 144 MMOL/L (ref 135–145)
SODIUM SERPL-SCNC: 145 MMOL/L (ref 135–145)
SODIUM SERPL-SCNC: 146 MMOL/L (ref 135–145)
SP GR UR STRIP: 1.01 (ref 1–1.03)
SPECIMEN EXPIRATION DATE: NORMAL
SPECIMEN EXPIRATION DATE: NORMAL
SQUAMOUS EPITHELIAL: 1 /HPF
SYSTOLIC BLOOD PRESSURE - MUSE: NORMAL MMHG
T AXIS - MUSE: 84 DEGREES
TROPONIN T SERPL HS-MCNC: 1131 NG/L
TROPONIN T SERPL HS-MCNC: 2596 NG/L
TROPONIN T SERPL HS-MCNC: 3250 NG/L
TROPONIN T SERPL HS-MCNC: 3497 NG/L
TROPONIN T SERPL HS-MCNC: ABNORMAL NG/L
TSH SERPL DL<=0.005 MIU/L-ACNC: 1.33 UIU/ML (ref 0.3–4.2)
UFH PPP CHRO-ACNC: 0.11 IU/ML
UFH PPP CHRO-ACNC: 0.12 IU/ML
UFH PPP CHRO-ACNC: 0.13 IU/ML
UFH PPP CHRO-ACNC: 0.15 IU/ML
UFH PPP CHRO-ACNC: 0.17 IU/ML
UFH PPP CHRO-ACNC: 0.17 IU/ML
UFH PPP CHRO-ACNC: 1 IU/ML
UFH PPP CHRO-ACNC: <0.1 IU/ML
UNIT ABO/RH: NORMAL
UNIT NUMBER: NORMAL
UNIT STATUS: NORMAL
UNIT TYPE ISBT: 600
UNIT TYPE ISBT: 6200
UROBILINOGEN UR STRIP-MCNC: NORMAL MG/DL
VENTRICULAR RATE- MUSE: 107 BPM
WBC # BLD AUTO: 18 10E3/UL (ref 4–11)
WBC # BLD AUTO: 20.7 10E3/UL (ref 4–11)
WBC # BLD AUTO: 21.3 10E3/UL (ref 4–11)
WBC # BLD AUTO: 23.4 10E3/UL (ref 4–11)
WBC # BLD AUTO: 24.1 10E3/UL (ref 4–11)
WBC # BLD AUTO: 27.8 10E3/UL (ref 4–11)
WBC # BLD AUTO: 27.8 10E3/UL (ref 4–11)
WBC # BLD AUTO: 27.9 10E3/UL (ref 4–11)
WBC URINE: 4 /HPF

## 2024-01-01 PROCEDURE — 250N000009 HC RX 250: Performed by: INTERNAL MEDICINE

## 2024-01-01 PROCEDURE — 250N000011 HC RX IP 250 OP 636: Performed by: INTERNAL MEDICINE

## 2024-01-01 PROCEDURE — 3E043XZ INTRODUCTION OF VASOPRESSOR INTO CENTRAL VEIN, PERCUTANEOUS APPROACH: ICD-10-PCS | Performed by: INTERNAL MEDICINE

## 2024-01-01 PROCEDURE — 86140 C-REACTIVE PROTEIN: CPT | Performed by: NURSE PRACTITIONER

## 2024-01-01 PROCEDURE — 82533 TOTAL CORTISOL: CPT | Performed by: NURSE PRACTITIONER

## 2024-01-01 PROCEDURE — 83605 ASSAY OF LACTIC ACID: CPT | Performed by: NURSE PRACTITIONER

## 2024-01-01 PROCEDURE — 82805 BLOOD GASES W/O2 SATURATION: CPT | Performed by: NURSE PRACTITIONER

## 2024-01-01 PROCEDURE — C1874 STENT, COATED/COV W/DEL SYS: HCPCS | Performed by: INTERNAL MEDICINE

## 2024-01-01 PROCEDURE — 999N000075 HC STATISTIC IABP MONITORING

## 2024-01-01 PROCEDURE — 84484 ASSAY OF TROPONIN QUANT: CPT | Performed by: NURSE PRACTITIONER

## 2024-01-01 PROCEDURE — 258N000003 HC RX IP 258 OP 636: Performed by: INTERNAL MEDICINE

## 2024-01-01 PROCEDURE — 85652 RBC SED RATE AUTOMATED: CPT | Performed by: NURSE PRACTITIONER

## 2024-01-01 PROCEDURE — C9113 INJ PANTOPRAZOLE SODIUM, VIA: HCPCS | Performed by: NURSE PRACTITIONER

## 2024-01-01 PROCEDURE — 99291 CRITICAL CARE FIRST HOUR: CPT | Mod: GC | Performed by: INTERNAL MEDICINE

## 2024-01-01 PROCEDURE — 99153 MOD SED SAME PHYS/QHP EA: CPT | Performed by: INTERNAL MEDICINE

## 2024-01-01 PROCEDURE — 999N000259 HC STATISTIC EXTUBATION

## 2024-01-01 PROCEDURE — 83051 HEMOGLOBIN PLASMA: CPT | Performed by: NURSE PRACTITIONER

## 2024-01-01 PROCEDURE — 93005 ELECTROCARDIOGRAM TRACING: CPT

## 2024-01-01 PROCEDURE — 71250 CT THORAX DX C-: CPT | Mod: 26 | Performed by: RADIOLOGY

## 2024-01-01 PROCEDURE — 82330 ASSAY OF CALCIUM: CPT | Performed by: NURSE PRACTITIONER

## 2024-01-01 PROCEDURE — 95720 EEG PHY/QHP EA INCR W/VEEG: CPT | Mod: GC | Performed by: PSYCHIATRY & NEUROLOGY

## 2024-01-01 PROCEDURE — 272N000001 HC OR GENERAL SUPPLY STERILE: Performed by: INTERNAL MEDICINE

## 2024-01-01 PROCEDURE — 87040 BLOOD CULTURE FOR BACTERIA: CPT

## 2024-01-01 PROCEDURE — 85730 THROMBOPLASTIN TIME PARTIAL: CPT | Performed by: NURSE PRACTITIONER

## 2024-01-01 PROCEDURE — 85027 COMPLETE CBC AUTOMATED: CPT | Performed by: NURSE PRACTITIONER

## 2024-01-01 PROCEDURE — 85347 COAGULATION TIME ACTIVATED: CPT

## 2024-01-01 PROCEDURE — 999N000185 HC STATISTIC TRANSPORT TIME EA 15 MIN

## 2024-01-01 PROCEDURE — 82040 ASSAY OF SERUM ALBUMIN: CPT | Performed by: NURSE PRACTITIONER

## 2024-01-01 PROCEDURE — 258N000003 HC RX IP 258 OP 636: Performed by: STUDENT IN AN ORGANIZED HEALTH CARE EDUCATION/TRAINING PROGRAM

## 2024-01-01 PROCEDURE — 5A1522G EXTRACORPOREAL OXYGENATION, MEMBRANE, PERIPHERAL VENO-ARTERIAL: ICD-10-PCS | Performed by: INTERNAL MEDICINE

## 2024-01-01 PROCEDURE — 33967 INSERT I-AORT PERCUT DEVICE: CPT | Mod: GC | Performed by: INTERNAL MEDICINE

## 2024-01-01 PROCEDURE — 93925 LOWER EXTREMITY STUDY: CPT

## 2024-01-01 PROCEDURE — 33210 INSERT ELECTRD/PM CATH SNGL: CPT | Mod: 74 | Performed by: INTERNAL MEDICINE

## 2024-01-01 PROCEDURE — 85396 CLOTTING ASSAY WHOLE BLOOD: CPT | Performed by: NURSE PRACTITIONER

## 2024-01-01 PROCEDURE — 86316 IMMUNOASSAY TUMOR OTHER: CPT | Performed by: NURSE PRACTITIONER

## 2024-01-01 PROCEDURE — C1725 CATH, TRANSLUMIN NON-LASER: HCPCS | Performed by: INTERNAL MEDICINE

## 2024-01-01 PROCEDURE — 272N000053 HC CANNULA, ARTERIAL FEMORAL

## 2024-01-01 PROCEDURE — 71045 X-RAY EXAM CHEST 1 VIEW: CPT

## 2024-01-01 PROCEDURE — C1887 CATHETER, GUIDING: HCPCS | Performed by: INTERNAL MEDICINE

## 2024-01-01 PROCEDURE — 250N000013 HC RX MED GY IP 250 OP 250 PS 637: Performed by: NURSE PRACTITIONER

## 2024-01-01 PROCEDURE — 84155 ASSAY OF PROTEIN SERUM: CPT | Performed by: NURSE PRACTITIONER

## 2024-01-01 PROCEDURE — 94002 VENT MGMT INPAT INIT DAY: CPT

## 2024-01-01 PROCEDURE — 258N000001 HC RX 258: Performed by: STUDENT IN AN ORGANIZED HEALTH CARE EDUCATION/TRAINING PROGRAM

## 2024-01-01 PROCEDURE — 80307 DRUG TEST PRSMV CHEM ANLYZR: CPT | Performed by: NURSE PRACTITIONER

## 2024-01-01 PROCEDURE — 80053 COMPREHEN METABOLIC PANEL: CPT | Performed by: INTERNAL MEDICINE

## 2024-01-01 PROCEDURE — 80347 BENZODIAZEPINES 13 OR MORE: CPT | Performed by: NURSE PRACTITIONER

## 2024-01-01 PROCEDURE — 95711 VEEG 2-12 HR UNMONITORED: CPT

## 2024-01-01 PROCEDURE — 36415 COLL VENOUS BLD VENIPUNCTURE: CPT

## 2024-01-01 PROCEDURE — 250N000013 HC RX MED GY IP 250 OP 250 PS 637: Performed by: STUDENT IN AN ORGANIZED HEALTH CARE EDUCATION/TRAINING PROGRAM

## 2024-01-01 PROCEDURE — 85384 FIBRINOGEN ACTIVITY: CPT | Performed by: NURSE PRACTITIONER

## 2024-01-01 PROCEDURE — 250N000011 HC RX IP 250 OP 636

## 2024-01-01 PROCEDURE — C1898 LEAD, PMKR, OTHER THAN TRANS: HCPCS | Performed by: INTERNAL MEDICINE

## 2024-01-01 PROCEDURE — C1751 CATH, INF, PER/CENT/MIDLINE: HCPCS | Performed by: INTERNAL MEDICINE

## 2024-01-01 PROCEDURE — 84132 ASSAY OF SERUM POTASSIUM: CPT | Performed by: STUDENT IN AN ORGANIZED HEALTH CARE EDUCATION/TRAINING PROGRAM

## 2024-01-01 PROCEDURE — 85300 ANTITHROMBIN III ACTIVITY: CPT | Performed by: NURSE PRACTITIONER

## 2024-01-01 PROCEDURE — 999N000065 XR CHEST PORT 1 VIEW

## 2024-01-01 PROCEDURE — 999N000077 HC STATISTIC INSERT IABP

## 2024-01-01 PROCEDURE — 999N000197 HC STATISTIC WOC PT EDUCATION, 0-15 MIN

## 2024-01-01 PROCEDURE — 85379 FIBRIN DEGRADATION QUANT: CPT | Performed by: NURSE PRACTITIONER

## 2024-01-01 PROCEDURE — 85610 PROTHROMBIN TIME: CPT | Performed by: NURSE PRACTITIONER

## 2024-01-01 PROCEDURE — 258N000003 HC RX IP 258 OP 636: Performed by: NURSE PRACTITIONER

## 2024-01-01 PROCEDURE — 200N000002 HC R&B ICU UMMC

## 2024-01-01 PROCEDURE — 85025 COMPLETE CBC W/AUTO DIFF WBC: CPT | Performed by: NURSE PRACTITIONER

## 2024-01-01 PROCEDURE — 33210 INSERT ELECTRD/PM CATH SNGL: CPT | Mod: 22 | Performed by: INTERNAL MEDICINE

## 2024-01-01 PROCEDURE — 99291 CRITICAL CARE FIRST HOUR: CPT | Mod: 25 | Performed by: INTERNAL MEDICINE

## 2024-01-01 PROCEDURE — 84295 ASSAY OF SERUM SODIUM: CPT

## 2024-01-01 PROCEDURE — 255N000002 HC RX 255 OP 636: Performed by: INTERNAL MEDICINE

## 2024-01-01 PROCEDURE — 83605 ASSAY OF LACTIC ACID: CPT

## 2024-01-01 PROCEDURE — 93454 CORONARY ARTERY ANGIO S&I: CPT | Performed by: INTERNAL MEDICINE

## 2024-01-01 PROCEDURE — 95714 VEEG EA 12-26 HR UNMNTR: CPT

## 2024-01-01 PROCEDURE — 36556 INSERT NON-TUNNEL CV CATH: CPT | Performed by: INTERNAL MEDICINE

## 2024-01-01 PROCEDURE — 70450 CT HEAD/BRAIN W/O DYE: CPT

## 2024-01-01 PROCEDURE — 33968 REMOVE AORTIC ASSIST DEVICE: CPT

## 2024-01-01 PROCEDURE — 81001 URINALYSIS AUTO W/SCOPE: CPT | Performed by: NURSE PRACTITIONER

## 2024-01-01 PROCEDURE — 84100 ASSAY OF PHOSPHORUS: CPT | Performed by: NURSE PRACTITIONER

## 2024-01-01 PROCEDURE — 82947 ASSAY GLUCOSE BLOOD QUANT: CPT | Performed by: NURSE PRACTITIONER

## 2024-01-01 PROCEDURE — 36556 INSERT NON-TUNNEL CV CATH: CPT | Mod: 59 | Performed by: INTERNAL MEDICINE

## 2024-01-01 PROCEDURE — 999N000157 HC STATISTIC RCP TIME EA 10 MIN

## 2024-01-01 PROCEDURE — 84443 ASSAY THYROID STIM HORMONE: CPT | Performed by: STUDENT IN AN ORGANIZED HEALTH CARE EDUCATION/TRAINING PROGRAM

## 2024-01-01 PROCEDURE — 85027 COMPLETE CBC AUTOMATED: CPT

## 2024-01-01 PROCEDURE — 250N000012 HC RX MED GY IP 250 OP 636 PS 637: Performed by: STUDENT IN AN ORGANIZED HEALTH CARE EDUCATION/TRAINING PROGRAM

## 2024-01-01 PROCEDURE — P9047 ALBUMIN (HUMAN), 25%, 50ML: HCPCS | Performed by: STUDENT IN AN ORGANIZED HEALTH CARE EDUCATION/TRAINING PROGRAM

## 2024-01-01 PROCEDURE — 84075 ASSAY ALKALINE PHOSPHATASE: CPT | Performed by: NURSE PRACTITIONER

## 2024-01-01 PROCEDURE — 95718 EEG PHYS/QHP 2-12 HR W/VEEG: CPT | Performed by: PSYCHIATRY & NEUROLOGY

## 2024-01-01 PROCEDURE — 83735 ASSAY OF MAGNESIUM: CPT | Performed by: NURSE PRACTITIONER

## 2024-01-01 PROCEDURE — 83036 HEMOGLOBIN GLYCOSYLATED A1C: CPT | Performed by: NURSE PRACTITIONER

## 2024-01-01 PROCEDURE — 82805 BLOOD GASES W/O2 SATURATION: CPT | Performed by: INTERNAL MEDICINE

## 2024-01-01 PROCEDURE — 93925 LOWER EXTREMITY STUDY: CPT | Mod: 26 | Performed by: RADIOLOGY

## 2024-01-01 PROCEDURE — 84145 PROCALCITONIN (PCT): CPT | Performed by: NURSE PRACTITIONER

## 2024-01-01 PROCEDURE — 272N000555 HC SENSOR NIRS OXIMETER, ADULT

## 2024-01-01 PROCEDURE — 250N000011 HC RX IP 250 OP 636: Performed by: NURSE PRACTITIONER

## 2024-01-01 PROCEDURE — 85014 HEMATOCRIT: CPT | Performed by: NURSE PRACTITIONER

## 2024-01-01 PROCEDURE — 33949 ECMO/ECLS DAILY MGMT ARTERY: CPT

## 2024-01-01 PROCEDURE — 85520 HEPARIN ASSAY: CPT | Performed by: NURSE PRACTITIONER

## 2024-01-01 PROCEDURE — C1769 GUIDE WIRE: HCPCS | Performed by: INTERNAL MEDICINE

## 2024-01-01 PROCEDURE — 33949 ECMO/ECLS DAILY MGMT ARTERY: CPT | Performed by: INTERNAL MEDICINE

## 2024-01-01 PROCEDURE — 33947 ECMO/ECLS INITIATION ARTERY: CPT | Mod: GC | Performed by: INTERNAL MEDICINE

## 2024-01-01 PROCEDURE — 33967 INSERT I-AORT PERCUT DEVICE: CPT | Performed by: INTERNAL MEDICINE

## 2024-01-01 PROCEDURE — 87637 SARSCOV2&INF A&B&RSV AMP PRB: CPT | Performed by: STUDENT IN AN ORGANIZED HEALTH CARE EDUCATION/TRAINING PROGRAM

## 2024-01-01 PROCEDURE — C9600 PERC DRUG-EL COR STENT SING: HCPCS | Performed by: INTERNAL MEDICINE

## 2024-01-01 PROCEDURE — 71045 X-RAY EXAM CHEST 1 VIEW: CPT | Mod: 26 | Performed by: RADIOLOGY

## 2024-01-01 PROCEDURE — 83615 LACTATE (LD) (LDH) ENZYME: CPT | Performed by: NURSE PRACTITIONER

## 2024-01-01 PROCEDURE — 86923 COMPATIBILITY TEST ELECTRIC: CPT | Performed by: NURSE PRACTITIONER

## 2024-01-01 PROCEDURE — 84460 ALANINE AMINO (ALT) (SGPT): CPT | Performed by: NURSE PRACTITIONER

## 2024-01-01 PROCEDURE — 74176 CT ABD & PELVIS W/O CONTRAST: CPT | Mod: 26 | Performed by: RADIOLOGY

## 2024-01-01 PROCEDURE — 99291 CRITICAL CARE FIRST HOUR: CPT | Performed by: PSYCHIATRY & NEUROLOGY

## 2024-01-01 PROCEDURE — 87205 SMEAR GRAM STAIN: CPT | Performed by: NURSE PRACTITIONER

## 2024-01-01 PROCEDURE — 272N000057 HC CATH BALLOON IABP

## 2024-01-01 PROCEDURE — 250N000009 HC RX 250: Performed by: NURSE PRACTITIONER

## 2024-01-01 PROCEDURE — P9016 RBC LEUKOCYTES REDUCED: HCPCS | Performed by: NURSE PRACTITIONER

## 2024-01-01 PROCEDURE — 93880 EXTRACRANIAL BILAT STUDY: CPT

## 2024-01-01 PROCEDURE — 250N000013 HC RX MED GY IP 250 OP 250 PS 637: Performed by: INTERNAL MEDICINE

## 2024-01-01 PROCEDURE — 92928 PRQ TCAT PLMT NTRAC ST 1 LES: CPT | Mod: LD | Performed by: INTERNAL MEDICINE

## 2024-01-01 PROCEDURE — 87640 STAPH A DNA AMP PROBE: CPT | Performed by: INTERNAL MEDICINE

## 2024-01-01 PROCEDURE — 410N000003 HC PER-PERFUSION 1ST 30 MIN: Performed by: INTERNAL MEDICINE

## 2024-01-01 PROCEDURE — 84132 ASSAY OF SERUM POTASSIUM: CPT | Performed by: INTERNAL MEDICINE

## 2024-01-01 PROCEDURE — 93308 TTE F-UP OR LMTD: CPT

## 2024-01-01 PROCEDURE — 87641 MR-STAPH DNA AMP PROBE: CPT | Performed by: INTERNAL MEDICINE

## 2024-01-01 PROCEDURE — B2111ZZ FLUOROSCOPY OF MULTIPLE CORONARY ARTERIES USING LOW OSMOLAR CONTRAST: ICD-10-PCS | Performed by: INTERNAL MEDICINE

## 2024-01-01 PROCEDURE — 93454 CORONARY ARTERY ANGIO S&I: CPT | Mod: 26 | Performed by: INTERNAL MEDICINE

## 2024-01-01 PROCEDURE — 33952 ECMO/ECLS INSJ PRPH CANNULA: CPT | Mod: GC | Performed by: INTERNAL MEDICINE

## 2024-01-01 PROCEDURE — 250N000011 HC RX IP 250 OP 636: Performed by: STUDENT IN AN ORGANIZED HEALTH CARE EDUCATION/TRAINING PROGRAM

## 2024-01-01 PROCEDURE — 94003 VENT MGMT INPAT SUBQ DAY: CPT

## 2024-01-01 PROCEDURE — C9460 INJECTION, CANGRELOR: HCPCS | Performed by: INTERNAL MEDICINE

## 2024-01-01 PROCEDURE — 33952 ECMO/ECLS INSJ PRPH CANNULA: CPT | Performed by: INTERNAL MEDICINE

## 2024-01-01 PROCEDURE — 36620 INSERTION CATHETER ARTERY: CPT | Mod: 59 | Performed by: INTERNAL MEDICINE

## 2024-01-01 PROCEDURE — 5A1223Z PERFORMANCE OF CARDIAC PACING, CONTINUOUS: ICD-10-PCS | Performed by: INTERNAL MEDICINE

## 2024-01-01 PROCEDURE — 70450 CT HEAD/BRAIN W/O DYE: CPT | Mod: 26 | Performed by: RADIOLOGY

## 2024-01-01 PROCEDURE — 82330 ASSAY OF CALCIUM: CPT

## 2024-01-01 PROCEDURE — 33947 ECMO/ECLS INITIATION ARTERY: CPT

## 2024-01-01 PROCEDURE — 99207 EEG VIDEO 2-12 HRS UNMONITORED: CPT | Performed by: PSYCHIATRY & NEUROLOGY

## 2024-01-01 PROCEDURE — 5A1945Z RESPIRATORY VENTILATION, 24-96 CONSECUTIVE HOURS: ICD-10-PCS | Performed by: INTERNAL MEDICINE

## 2024-01-01 PROCEDURE — 272N000232 HC CANNULA, VENOUS FEMORAL, ADULT

## 2024-01-01 PROCEDURE — 86900 BLOOD TYPING SEROLOGIC ABO: CPT | Performed by: NURSE PRACTITIONER

## 2024-01-01 PROCEDURE — 93308 TTE F-UP OR LMTD: CPT | Mod: 26 | Performed by: STUDENT IN AN ORGANIZED HEALTH CARE EDUCATION/TRAINING PROGRAM

## 2024-01-01 PROCEDURE — 027035Z DILATION OF CORONARY ARTERY, ONE ARTERY WITH TWO DRUG-ELUTING INTRALUMINAL DEVICES, PERCUTANEOUS APPROACH: ICD-10-PCS | Performed by: INTERNAL MEDICINE

## 2024-01-01 PROCEDURE — 5A02210 ASSISTANCE WITH CARDIAC OUTPUT USING BALLOON PUMP, CONTINUOUS: ICD-10-PCS | Performed by: INTERNAL MEDICINE

## 2024-01-01 PROCEDURE — 250N000011 HC RX IP 250 OP 636: Mod: JZ | Performed by: STUDENT IN AN ORGANIZED HEALTH CARE EDUCATION/TRAINING PROGRAM

## 2024-01-01 PROCEDURE — 99152 MOD SED SAME PHYS/QHP 5/>YRS: CPT | Performed by: INTERNAL MEDICINE

## 2024-01-01 PROCEDURE — 272N000237 HC CARDIOHELP CIRCUIT

## 2024-01-01 PROCEDURE — 80053 COMPREHEN METABOLIC PANEL: CPT | Performed by: NURSE PRACTITIONER

## 2024-01-01 PROCEDURE — C1894 INTRO/SHEATH, NON-LASER: HCPCS | Performed by: INTERNAL MEDICINE

## 2024-01-01 PROCEDURE — 36620 INSERTION CATHETER ARTERY: CPT | Mod: XE | Performed by: INTERNAL MEDICINE

## 2024-01-01 PROCEDURE — 93566 NJX CAR CTH SLCTV RV/RA ANG: CPT | Performed by: INTERNAL MEDICINE

## 2024-01-01 PROCEDURE — 99207 PR NO BILLABLE SERVICE THIS VISIT: CPT

## 2024-01-01 PROCEDURE — 93880 EXTRACRANIAL BILAT STUDY: CPT | Mod: 26 | Performed by: RADIOLOGY

## 2024-01-01 PROCEDURE — 82330 ASSAY OF CALCIUM: CPT | Performed by: INTERNAL MEDICINE

## 2024-01-01 PROCEDURE — 71045 X-RAY EXAM CHEST 1 VIEW: CPT | Mod: 26 | Performed by: STUDENT IN AN ORGANIZED HEALTH CARE EDUCATION/TRAINING PROGRAM

## 2024-01-01 PROCEDURE — 95718 EEG PHYS/QHP 2-12 HR W/VEEG: CPT | Mod: GC | Performed by: PSYCHIATRY & NEUROLOGY

## 2024-01-01 PROCEDURE — 71250 CT THORAX DX C-: CPT

## 2024-01-01 PROCEDURE — 99152 MOD SED SAME PHYS/QHP 5/>YRS: CPT | Mod: GC | Performed by: INTERNAL MEDICINE

## 2024-01-01 PROCEDURE — 82805 BLOOD GASES W/O2 SATURATION: CPT

## 2024-01-01 PROCEDURE — G0463 HOSPITAL OUTPT CLINIC VISIT: HCPCS | Mod: 25

## 2024-01-01 PROCEDURE — 99207 PR NO BILLABLE SERVICE THIS VISIT: CPT | Performed by: INTERNAL MEDICINE

## 2024-01-01 DEVICE — CATH CENTRAL LINE MULTI LUMEN 7FRX20CM CDC-45703-XP1A: Type: IMPLANTABLE DEVICE | Status: FUNCTIONAL

## 2024-01-01 DEVICE — STENT CORONARY DES SYNERGY XD MR US 2.75X32MM H7493941832270: Type: IMPLANTABLE DEVICE | Status: FUNCTIONAL

## 2024-01-01 DEVICE — STENT CORONARY DES SYNERGY XD MR US 2.50X38MM H7493941838250: Type: IMPLANTABLE DEVICE | Status: FUNCTIONAL

## 2024-01-01 RX ORDER — POTASSIUM CHLORIDE 29.8 MG/ML
INJECTION INTRAVENOUS CONTINUOUS PRN
Status: COMPLETED | OUTPATIENT
Start: 2024-01-01 | End: 2024-01-01

## 2024-01-01 RX ORDER — POTASSIUM CHLORIDE 29.8 MG/ML
20 INJECTION INTRAVENOUS
Status: DISCONTINUED | OUTPATIENT
Start: 2024-01-01 | End: 2024-01-01

## 2024-01-01 RX ORDER — MIDAZOLAM HCL IN 0.9 % NACL/PF 1 MG/ML
PLASTIC BAG, INJECTION (ML) INTRAVENOUS CONTINUOUS PRN
Status: DISCONTINUED | OUTPATIENT
Start: 2024-01-01 | End: 2024-01-01 | Stop reason: HOSPADM

## 2024-01-01 RX ORDER — SODIUM POLYSTYRENE SULFONATE 15 G/60ML
30 SUSPENSION ORAL; RECTAL ONCE
Status: COMPLETED | OUTPATIENT
Start: 2024-01-01 | End: 2024-01-01

## 2024-01-01 RX ORDER — POTASSIUM CHLORIDE 29.8 MG/ML
20 INJECTION INTRAVENOUS ONCE
Qty: 50 ML | Refills: 0 | Status: COMPLETED | OUTPATIENT
Start: 2024-01-01 | End: 2024-01-01

## 2024-01-01 RX ORDER — HEPARIN SODIUM 200 [USP'U]/100ML
3 INJECTION, SOLUTION INTRAVENOUS CONTINUOUS
Status: DISCONTINUED | OUTPATIENT
Start: 2024-01-01 | End: 2024-01-01

## 2024-01-01 RX ORDER — FENTANYL CITRATE 50 UG/ML
INJECTION, SOLUTION INTRAMUSCULAR; INTRAVENOUS
Status: DISCONTINUED | OUTPATIENT
Start: 2024-01-01 | End: 2024-01-01 | Stop reason: HOSPADM

## 2024-01-01 RX ORDER — HEPARIN SODIUM 10000 [USP'U]/100ML
10-50 INJECTION, SOLUTION INTRAVENOUS CONTINUOUS
Status: DISCONTINUED | OUTPATIENT
Start: 2024-01-01 | End: 2024-01-01

## 2024-01-01 RX ORDER — LIDOCAINE 40 MG/G
CREAM TOPICAL
Status: DISCONTINUED | OUTPATIENT
Start: 2024-01-01 | End: 2024-01-01

## 2024-01-01 RX ORDER — ASPIRIN 325 MG
TABLET ORAL
Status: DISCONTINUED | OUTPATIENT
Start: 2024-01-01 | End: 2024-01-01 | Stop reason: HOSPADM

## 2024-01-01 RX ORDER — NALOXONE HYDROCHLORIDE 0.4 MG/ML
0.4 INJECTION, SOLUTION INTRAMUSCULAR; INTRAVENOUS; SUBCUTANEOUS
Status: DISCONTINUED | OUTPATIENT
Start: 2024-01-01 | End: 2024-01-01

## 2024-01-01 RX ORDER — DEXTROSE MONOHYDRATE 25 G/50ML
25-50 INJECTION, SOLUTION INTRAVENOUS
Status: DISCONTINUED | OUTPATIENT
Start: 2024-01-01 | End: 2024-01-01

## 2024-01-01 RX ORDER — ASPIRIN 325 MG
325 TABLET ORAL ONCE
Status: DISCONTINUED | OUTPATIENT
Start: 2024-01-01 | End: 2024-01-01

## 2024-01-01 RX ORDER — GLYCOPYRROLATE 0.2 MG/ML
0.2 INJECTION, SOLUTION INTRAMUSCULAR; INTRAVENOUS ONCE
Status: DISCONTINUED | OUTPATIENT
Start: 2024-01-01 | End: 2024-01-01

## 2024-01-01 RX ORDER — DOBUTAMINE HCL IN DEXTROSE 5 % 500MG/250
10 INTRAVENOUS SOLUTION INTRAVENOUS CONTINUOUS
Status: DISCONTINUED | OUTPATIENT
Start: 2024-01-01 | End: 2024-01-01

## 2024-01-01 RX ORDER — NALOXONE HYDROCHLORIDE 0.4 MG/ML
0.2 INJECTION, SOLUTION INTRAMUSCULAR; INTRAVENOUS; SUBCUTANEOUS
Status: DISCONTINUED | OUTPATIENT
Start: 2024-01-01 | End: 2024-01-01

## 2024-01-01 RX ORDER — POTASSIUM CHLORIDE 20MEQ/15ML
20 LIQUID (ML) ORAL ONCE
Status: COMPLETED | OUTPATIENT
Start: 2024-01-01 | End: 2024-01-01

## 2024-01-01 RX ORDER — NICOTINE POLACRILEX 4 MG
15-30 LOZENGE BUCCAL
Status: DISCONTINUED | OUTPATIENT
Start: 2024-01-01 | End: 2024-01-01

## 2024-01-01 RX ORDER — IOPAMIDOL 612 MG/ML
INJECTION, SOLUTION INTRAVASCULAR
Status: DISCONTINUED | OUTPATIENT
Start: 2024-01-01 | End: 2024-01-01

## 2024-01-01 RX ORDER — ASPIRIN 81 MG/1
81 TABLET, CHEWABLE ORAL DAILY
Status: DISCONTINUED | OUTPATIENT
Start: 2024-01-01 | End: 2024-01-01

## 2024-01-01 RX ORDER — POLYETHYLENE GLYCOL 3350 17 G/17G
17 POWDER, FOR SOLUTION ORAL DAILY PRN
Status: DISCONTINUED | OUTPATIENT
Start: 2024-01-01 | End: 2024-01-01

## 2024-01-01 RX ORDER — MIDAZOLAM HCL IN 0.9 % NACL/PF 1 MG/ML
1-8 PLASTIC BAG, INJECTION (ML) INTRAVENOUS CONTINUOUS
Status: DISCONTINUED | OUTPATIENT
Start: 2024-01-01 | End: 2024-01-01

## 2024-01-01 RX ORDER — NOREPINEPHRINE BITARTRATE 0.06 MG/ML
.01-.6 INJECTION, SOLUTION INTRAVENOUS CONTINUOUS
Status: DISCONTINUED | OUTPATIENT
Start: 2024-01-01 | End: 2024-01-01

## 2024-01-01 RX ORDER — POTASSIUM CHLORIDE 29.8 MG/ML
20 INJECTION INTRAVENOUS ONCE
Status: COMPLETED | OUTPATIENT
Start: 2024-01-01 | End: 2024-01-01

## 2024-01-01 RX ORDER — IOPAMIDOL 755 MG/ML
INJECTION, SOLUTION INTRAVASCULAR
Status: DISCONTINUED | OUTPATIENT
Start: 2024-01-01 | End: 2024-01-01 | Stop reason: HOSPADM

## 2024-01-01 RX ORDER — HEPARIN SODIUM 1000 [USP'U]/ML
INJECTION, SOLUTION INTRAVENOUS; SUBCUTANEOUS
Status: DISCONTINUED | OUTPATIENT
Start: 2024-01-01 | End: 2024-01-01 | Stop reason: HOSPADM

## 2024-01-01 RX ORDER — FENTANYL CITRATE 50 UG/ML
50-200 INJECTION, SOLUTION INTRAMUSCULAR; INTRAVENOUS EVERY 10 MIN PRN
Status: DISCONTINUED | OUTPATIENT
Start: 2024-01-01 | End: 2024-01-01

## 2024-01-01 RX ORDER — NOREPINEPHRINE BITARTRATE 0.06 MG/ML
INJECTION, SOLUTION INTRAVENOUS CONTINUOUS PRN
Status: COMPLETED | OUTPATIENT
Start: 2024-01-01 | End: 2024-01-01

## 2024-01-01 RX ORDER — CEFTRIAXONE 2 G/1
2 INJECTION, POWDER, FOR SOLUTION INTRAMUSCULAR; INTRAVENOUS EVERY 24 HOURS
Status: DISCONTINUED | OUTPATIENT
Start: 2024-01-01 | End: 2024-01-01

## 2024-01-01 RX ORDER — AMOXICILLIN 250 MG
1 CAPSULE ORAL 2 TIMES DAILY PRN
Status: DISCONTINUED | OUTPATIENT
Start: 2024-01-01 | End: 2024-01-01

## 2024-01-01 RX ORDER — EPINEPHRINE 0.1 MG/ML
INJECTION INTRAVENOUS
Status: DISCONTINUED | OUTPATIENT
Start: 2024-01-01 | End: 2024-01-01 | Stop reason: HOSPADM

## 2024-01-01 RX ORDER — CALCIUM GLUCONATE 20 MG/ML
2 INJECTION, SOLUTION INTRAVENOUS ONCE
Status: DISCONTINUED | OUTPATIENT
Start: 2024-01-01 | End: 2024-01-01

## 2024-01-01 RX ORDER — FENTANYL CITRATE 50 UG/ML
INJECTION, SOLUTION INTRAMUSCULAR; INTRAVENOUS
Status: DISCONTINUED | OUTPATIENT
Start: 2024-01-01 | End: 2024-01-01

## 2024-01-01 RX ORDER — DEXTROSE MONOHYDRATE 25 G/50ML
25 INJECTION, SOLUTION INTRAVENOUS ONCE
Status: COMPLETED | OUTPATIENT
Start: 2024-01-01 | End: 2024-01-01

## 2024-01-01 RX ORDER — POTASSIUM CHLORIDE 20MEQ/15ML
40 LIQUID (ML) ORAL ONCE
Status: COMPLETED | OUTPATIENT
Start: 2024-01-01 | End: 2024-01-01

## 2024-01-01 RX ORDER — MAGNESIUM SULFATE HEPTAHYDRATE 40 MG/ML
2 INJECTION, SOLUTION INTRAVENOUS DAILY PRN
Status: DISCONTINUED | OUTPATIENT
Start: 2024-01-01 | End: 2024-01-01

## 2024-01-01 RX ORDER — MAGNESIUM SULFATE HEPTAHYDRATE 40 MG/ML
4 INJECTION, SOLUTION INTRAVENOUS EVERY 4 HOURS PRN
Status: DISCONTINUED | OUTPATIENT
Start: 2024-01-01 | End: 2024-01-01

## 2024-01-01 RX ORDER — ALBUMIN (HUMAN) 12.5 G/50ML
25 SOLUTION INTRAVENOUS EVERY 4 HOURS
Status: DISCONTINUED | OUTPATIENT
Start: 2024-01-01 | End: 2024-01-01

## 2024-01-01 RX ORDER — FENTANYL CITRATE 50 UG/ML
50-100 INJECTION, SOLUTION INTRAMUSCULAR; INTRAVENOUS ONCE
Status: COMPLETED | OUTPATIENT
Start: 2024-01-01 | End: 2024-01-01

## 2024-01-01 RX ORDER — NICARDIPINE HYDROCHLORIDE 2.5 MG/ML
INJECTION INTRAVENOUS
Status: DISCONTINUED | OUTPATIENT
Start: 2024-01-01 | End: 2024-01-01 | Stop reason: HOSPADM

## 2024-01-01 RX ORDER — DEXTROSE MONOHYDRATE 100 MG/ML
INJECTION, SOLUTION INTRAVENOUS CONTINUOUS PRN
Status: DISCONTINUED | OUTPATIENT
Start: 2024-01-01 | End: 2024-01-01 | Stop reason: HOSPADM

## 2024-01-01 RX ORDER — GLYCOPYRROLATE 0.2 MG/ML
0.1 INJECTION, SOLUTION INTRAMUSCULAR; INTRAVENOUS EVERY 4 HOURS PRN
Status: DISCONTINUED | OUTPATIENT
Start: 2024-01-01 | End: 2024-01-01

## 2024-01-01 RX ORDER — FENTANYL CITRATE 50 UG/ML
100-200 INJECTION, SOLUTION INTRAMUSCULAR; INTRAVENOUS EVERY 5 MIN PRN
Status: DISCONTINUED | OUTPATIENT
Start: 2024-01-01 | End: 2024-01-01

## 2024-01-01 RX ORDER — AMOXICILLIN 250 MG
2 CAPSULE ORAL 2 TIMES DAILY PRN
Status: DISCONTINUED | OUTPATIENT
Start: 2024-01-01 | End: 2024-01-01

## 2024-01-01 RX ORDER — BISACODYL 10 MG
10 SUPPOSITORY, RECTAL RECTAL DAILY PRN
Status: DISCONTINUED | OUTPATIENT
Start: 2024-01-01 | End: 2024-01-01

## 2024-01-01 RX ORDER — FENTANYL CITRATE-0.9 % NACL/PF 10 MCG/ML
PLASTIC BAG, INJECTION (ML) INTRAVENOUS
Status: DISCONTINUED | OUTPATIENT
Start: 2024-01-01 | End: 2024-01-01 | Stop reason: HOSPADM

## 2024-01-01 RX ADMIN — CEFTRIAXONE SODIUM 2 G: 2 INJECTION, POWDER, FOR SOLUTION INTRAMUSCULAR; INTRAVENOUS at 17:39

## 2024-01-01 RX ADMIN — TICAGRELOR 90 MG: 90 TABLET ORAL at 08:18

## 2024-01-01 RX ADMIN — DOBUTAMINE 10 MCG/KG/MIN: 12.5 INJECTION, SOLUTION, CONCENTRATE INTRAVENOUS at 17:58

## 2024-01-01 RX ADMIN — HUMAN INSULIN 10 UNITS: 100 INJECTION, SOLUTION SUBCUTANEOUS at 03:09

## 2024-01-01 RX ADMIN — HYDROCORTISONE SODIUM SUCCINATE 50 MG: 100 INJECTION, POWDER, FOR SOLUTION INTRAMUSCULAR; INTRAVENOUS at 22:07

## 2024-01-01 RX ADMIN — POTASSIUM CHLORIDE 20 MEQ: 1.5 SOLUTION ORAL at 19:13

## 2024-01-01 RX ADMIN — HYDROCORTISONE SODIUM SUCCINATE 50 MG: 100 INJECTION, POWDER, FOR SOLUTION INTRAMUSCULAR; INTRAVENOUS at 05:17

## 2024-01-01 RX ADMIN — EPINEPHRINE 0.2 MCG/KG/MIN: 1 INJECTION INTRAMUSCULAR; INTRAVENOUS; SUBCUTANEOUS at 18:42

## 2024-01-01 RX ADMIN — ALBUMIN HUMAN 25 G: 0.25 SOLUTION INTRAVENOUS at 17:53

## 2024-01-01 RX ADMIN — Medication 25 MCG/HR: at 16:45

## 2024-01-01 RX ADMIN — HEPARIN SODIUM 3 ML/HR: 200 INJECTION, SOLUTION INTRAVENOUS at 17:03

## 2024-01-01 RX ADMIN — CALCIUM CHLORIDE 1 G: 100 INJECTION, SOLUTION INTRAVENOUS at 06:58

## 2024-01-01 RX ADMIN — HEPARIN SODIUM AND DEXTROSE 710 UNITS/HR: 10000; 5 INJECTION INTRAVENOUS at 18:08

## 2024-01-01 RX ADMIN — HYDROCORTISONE SODIUM SUCCINATE 50 MG: 100 INJECTION, POWDER, FOR SOLUTION INTRAMUSCULAR; INTRAVENOUS at 17:39

## 2024-01-01 RX ADMIN — PANTOPRAZOLE SODIUM 40 MG: 40 INJECTION, POWDER, FOR SOLUTION INTRAVENOUS at 08:14

## 2024-01-01 RX ADMIN — POTASSIUM CHLORIDE 40 MEQ: 40 SOLUTION ORAL at 23:57

## 2024-01-01 RX ADMIN — HYDROCORTISONE SODIUM SUCCINATE 50 MG: 100 INJECTION, POWDER, FOR SOLUTION INTRAMUSCULAR; INTRAVENOUS at 09:46

## 2024-01-01 RX ADMIN — TICAGRELOR 90 MG: 90 TABLET ORAL at 08:14

## 2024-01-01 RX ADMIN — DEXTROSE MONOHYDRATE 300 ML: 100 INJECTION, SOLUTION INTRAVENOUS at 08:06

## 2024-01-01 RX ADMIN — SODIUM CHLORIDE 10 UNITS: 9 INJECTION, SOLUTION INTRAVENOUS at 07:07

## 2024-01-01 RX ADMIN — NOREPINEPHRINE BITARTRATE 0.3 MCG/KG/MIN: 0.06 INJECTION, SOLUTION INTRAVENOUS at 16:15

## 2024-01-01 RX ADMIN — DEXTROSE MONOHYDRATE 25 G: 25 INJECTION, SOLUTION INTRAVENOUS at 07:03

## 2024-01-01 RX ADMIN — VANCOMYCIN HYDROCHLORIDE 1750 MG: 1 INJECTION, POWDER, LYOPHILIZED, FOR SOLUTION INTRAVENOUS at 19:12

## 2024-01-01 RX ADMIN — HYDROCORTISONE SODIUM SUCCINATE 50 MG: 100 INJECTION, POWDER, FOR SOLUTION INTRAMUSCULAR; INTRAVENOUS at 23:08

## 2024-01-01 RX ADMIN — INSULIN HUMAN 3 UNITS/HR: 1 INJECTION, SOLUTION INTRAVENOUS at 16:47

## 2024-01-01 RX ADMIN — WHITE PETROLATUM 57.7 %-MINERAL OIL 31.9 % EYE OINTMENT: at 16:47

## 2024-01-01 RX ADMIN — TICAGRELOR 90 MG: 90 TABLET ORAL at 20:40

## 2024-01-01 RX ADMIN — POTASSIUM CHLORIDE 20 MEQ: 29.8 INJECTION, SOLUTION INTRAVENOUS at 23:42

## 2024-01-01 RX ADMIN — Medication 4 MCG/KG/MIN: at 16:08

## 2024-01-01 RX ADMIN — Medication 2 UNITS/HR: at 05:35

## 2024-01-01 RX ADMIN — CALCIUM CHLORIDE 1 G: 100 INJECTION, SOLUTION INTRAVENOUS at 18:48

## 2024-01-01 RX ADMIN — ALBUMIN HUMAN 25 G: 0.25 SOLUTION INTRAVENOUS at 05:26

## 2024-01-01 RX ADMIN — Medication 40 MG: at 08:18

## 2024-01-01 RX ADMIN — NOREPINEPHRINE BITARTRATE 0.15 MCG/KG/MIN: 0.06 INJECTION, SOLUTION INTRAVENOUS at 22:08

## 2024-01-01 RX ADMIN — INSULIN HUMAN 4 UNITS/HR: 1 INJECTION, SOLUTION INTRAVENOUS at 09:59

## 2024-01-01 RX ADMIN — POTASSIUM CHLORIDE 20 MEQ: 29.8 INJECTION, SOLUTION INTRAVENOUS at 23:47

## 2024-01-01 RX ADMIN — SODIUM BICARBONATE 100 MEQ: 84 INJECTION, SOLUTION INTRAVENOUS at 16:53

## 2024-01-01 RX ADMIN — NOREPINEPHRINE BITARTRATE 0.2 MCG/KG/MIN: 0.06 INJECTION, SOLUTION INTRAVENOUS at 16:01

## 2024-01-01 RX ADMIN — SODIUM POLYSTYRENE SULFONATE 30 G: 15 SUSPENSION ORAL; RECTAL at 20:02

## 2024-01-01 RX ADMIN — POTASSIUM CHLORIDE 20 MEQ: 29.8 INJECTION, SOLUTION INTRAVENOUS at 06:01

## 2024-01-01 RX ADMIN — PANTOPRAZOLE SODIUM 40 MG: 40 INJECTION, POWDER, FOR SOLUTION INTRAVENOUS at 16:59

## 2024-01-01 RX ADMIN — DEXTROSE MONOHYDRATE 25 G: 25 INJECTION, SOLUTION INTRAVENOUS at 03:08

## 2024-01-01 RX ADMIN — HYDROCORTISONE SODIUM SUCCINATE 100 MG: 100 INJECTION, POWDER, FOR SOLUTION INTRAMUSCULAR; INTRAVENOUS at 16:59

## 2024-01-01 RX ADMIN — EPINEPHRINE 0.1 MCG/KG/MIN: 1 INJECTION INTRAMUSCULAR; INTRAVENOUS; SUBCUTANEOUS at 16:12

## 2024-01-01 RX ADMIN — CEFTRIAXONE SODIUM 2 G: 2 INJECTION, POWDER, FOR SOLUTION INTRAMUSCULAR; INTRAVENOUS at 17:13

## 2024-01-01 RX ADMIN — ALBUMIN HUMAN 25 G: 0.25 SOLUTION INTRAVENOUS at 20:45

## 2024-01-01 RX ADMIN — Medication 2 UNITS/HR: at 06:31

## 2024-01-01 RX ADMIN — ASPIRIN 81 MG CHEWABLE TABLET 81 MG: 81 TABLET CHEWABLE at 08:14

## 2024-01-01 RX ADMIN — DOBUTAMINE 10 MCG/KG/MIN: 12.5 INJECTION, SOLUTION, CONCENTRATE INTRAVENOUS at 18:46

## 2024-01-01 RX ADMIN — HYDROCORTISONE SODIUM SUCCINATE 50 MG: 100 INJECTION, POWDER, FOR SOLUTION INTRAMUSCULAR; INTRAVENOUS at 03:50

## 2024-01-01 RX ADMIN — Medication 4 UNITS/HR: at 18:36

## 2024-01-01 RX ADMIN — WHITE PETROLATUM 57.7 %-MINERAL OIL 31.9 % EYE OINTMENT: at 22:08

## 2024-01-01 RX ADMIN — MIDAZOLAM 2 MG: 1 INJECTION INTRAMUSCULAR; INTRAVENOUS at 11:07

## 2024-01-01 RX ADMIN — WHITE PETROLATUM 57.7 %-MINERAL OIL 31.9 % EYE OINTMENT: at 06:59

## 2024-01-01 RX ADMIN — NOREPINEPHRINE BITARTRATE 0.2 MCG/KG/MIN: 0.06 INJECTION, SOLUTION INTRAVENOUS at 06:31

## 2024-01-01 RX ADMIN — ALBUMIN HUMAN 25 G: 0.25 SOLUTION INTRAVENOUS at 01:28

## 2024-01-01 RX ADMIN — EPINEPHRINE 0.1 MCG/KG/MIN: 1 INJECTION INTRAMUSCULAR; INTRAVENOUS; SUBCUTANEOUS at 04:40

## 2024-01-01 RX ADMIN — ASPIRIN 81 MG CHEWABLE TABLET 81 MG: 81 TABLET CHEWABLE at 08:18

## 2024-01-01 RX ADMIN — Medication 4 MCG/KG/MIN: at 19:00

## 2024-01-01 RX ADMIN — CALCIUM CHLORIDE 1 G: 100 INJECTION, SOLUTION INTRAVENOUS at 01:56

## 2024-01-01 RX ADMIN — TICAGRELOR 90 MG: 90 TABLET ORAL at 22:56

## 2024-01-01 RX ADMIN — FENTANYL CITRATE 100 MCG: 50 INJECTION, SOLUTION INTRAMUSCULAR; INTRAVENOUS at 11:07

## 2024-01-01 RX ADMIN — ALBUMIN HUMAN 25 G: 0.25 SOLUTION INTRAVENOUS at 09:46

## 2024-01-01 ASSESSMENT — ACTIVITIES OF DAILY LIVING (ADL)
ADLS_ACUITY_SCORE: 55
ADLS_ACUITY_SCORE: 47
ADLS_ACUITY_SCORE: 35
ADLS_ACUITY_SCORE: 35
ADLS_ACUITY_SCORE: 55
ADLS_ACUITY_SCORE: 47
DEPENDENT_IADLS:: CLEANING;COOKING;LAUNDRY;TRANSPORTATION
ADLS_ACUITY_SCORE: 51
ADLS_ACUITY_SCORE: 47
ADLS_ACUITY_SCORE: 55
ADLS_ACUITY_SCORE: 47
ADLS_ACUITY_SCORE: 47
ADLS_ACUITY_SCORE: 51
ADLS_ACUITY_SCORE: 55
ADLS_ACUITY_SCORE: 35
ADLS_ACUITY_SCORE: 47

## 2024-02-18 PROBLEM — I46.9 CARDIAC ARREST (H): Status: ACTIVE | Noted: 2024-01-01

## 2024-02-18 NOTE — Clinical Note
Removed intact Today you were evaluated by Deandra Cadena CNP.    Please follow-up in 4  weeks or sooner if needed. If you have any questions or concerns, please contact my office at [phone].     You will be started on Doxycycline, twice daily, for 10 days.    As discussed, use of doxycycline can cause a skin sensitivity reaction with the sun. Avoid sun exposure while taking this medication. Also, do not take this medication with dairy. Take a daily probiotic.    A CT scan was ordered today. This is a non-contrast CT scan. Please call the  to create an appointment for this scan. However, we do not want you to obtain the imaging until you have completed the full course of the antibiotic medication prescribed. We would also like for you to obtain this CT scan at a [facility] facility.     Please contact our scheduling and  service at [phone]. They will work with you to get your test, imaging, or other appointments scheduled that might have been ordered.

## 2024-02-18 NOTE — PROGRESS NOTES
[facility]         Intra-Aortic Balloon Pump Insertion:       Arrival Date: 2/18/2024  Inserted at: [facility]    Insertion Date: 2/18/2024  Insertion Time: 1340  Insertion Locations: CCL    Insertion Details:  Physician: Nii  Site: Left Femoral Artery  Catheter Size: 8 Fr.  Balloon Volume: 50 cc  Fiberoptic: NO  Sheath: YES  Position verified with: fluoroscopy    Initial Settings:  Mode: Auto        Machine #: [id number]      Dell Nava  ECMO Specialist  2/18/2024 5:12 PM

## 2024-02-18 NOTE — CONSULTS
Neurocritical Care Consultation    Reason for critical care admission: Cardiac Arrest  Admitting Team: CSI  Date of Service:  02/18/2024  Date of Admission:  2/18/2024  Hospital Day: 1    Assessment/Plan  Riki Duarte is a 89 year old male with unknown past medical history admitted on 2/18/2024 for out-of-hospital refractory VT/VF cardiac arrest at 11:35AM in the [employer], Bystander initiated CPR, and he was BIBA to the [facility]. Coronary angiogram revealed LAD occlusion with successful PCI x1 HUI (ESTUARDO 1). NCC was consulted for neuroprognostication.    24 hour events:  Cardiac arrest.    Neuro  #post-arrest cerebral edema  #hypoxic encephalopathy 2.2 cardiac arrest  #stimulus-induced jaw closing movements concern for epileptiform activity  Post arrest day: 1  Length of downtime (time to cannulation): Approximately 1 hour (Procedure starting time at 12:35 PM)  -Witnessed arrest: Yes  -ROSC: No, rhythm: VT/VF  -TTM initiated: No  -Current temp: 36.2 C     Analgesics & sedation  Prior sedation: midazolam / fentanyl  Current sedation: midazolam    Exam findings   -Cough: Could not assess, biting tube  -Gag: deferred  -Pupils: R size 2 mm NPI 3.5 L size 2 mm NPI 3.3, pupillometry     Neuroimaging  -Day 1 CTH shows: diffuse cerebral edema  -Repeat CTH on day 3     Neurophysiology  -start vEEG with VA ECMO  -vEEG shows: pending    Biomarkers  -Neuron-specific Enolase (NSE) results: pending  -S 100B protein: pending    Recommendations  -Continue sedation for now, follow up EEG initial read for further up titration to burst-suppression if epileptiform activity is present  -Avoid hypotonic solutions as they may worsen cerebral edema   -Avoid nitroprusside or nitroglycerin as they may also worsen cerbral edema  -Advise slow correction of hypernatremia if needed  -When safe to do so, please limit use of CNS acting medications such as benzodiazepines, opiates, anticholinergics, which will permit a more  accurate neurological assessment  -We will continue to follow, please call [phone] with any questions    Clinically Significant Risk Factors Present on Admission                               # Cerebral edema     The NCC attending is Dr. Johann Medina.    Clement Fabian MD on 2/18/2024 at 2:26 PM  Neurocritical Care  *[phone]    History of Present Illness:  Riki Duarte is a 89 year old male with unknown past medical history admitted on 2/18/2024 for out-of-hospital refractory VT/VF cardiac arrest in the [facility], Bystander initiated CPR, and he was BIBA to the [facility]. NCC was consulted for neuroprognostication.    Not on File    Current Medications:   artificial tears   Both Eyes Q8H    pantoprazole  40 mg Intravenous Daily       PRN Medications:  calcium chloride, fentaNYL (PF), fentaNYL, heparin ANTICOAGULANT, lidocaine 4%, lidocaine (buffered or not buffered), magnesium sulfate, magnesium sulfate, midazolam, midazolam, naloxone **OR** naloxone **OR** naloxone **OR** naloxone, potassium chloride, sodium chloride (PF), sodium phosphate 10 mmol in sodium chloride 0.9 % 250 mL intermittent infusion, sodium phosphate, sodium phosphate 20 mmol in sodium chloride 0.9 % 250 mL intermittent infusion, sodium phosphate 25 mmol in sodium chloride 0.9 % 500 mL intermittent infusion    Infusions:   EPINEPHrine      fentaNYL      heparin 2 unit/mL in 0.9% NaCl      HEParin      midazolam 6 mg/hr (02/18/24 1403)    norepinephrine         Physical Examination:  Vitals: Resp 18   General: Adult male patient, lying in bed, critically-ill  HEENT: Normocephalic, atraumatic, no icterus, oral cavity/oropharynx pink and moist  Cardiac: RRR, s1/s2 auscultated without murmur,   Pulm: CTAB, unlabored, expansion symmetric, no retractions or use of accessory muscles  Abdomen: Soft, non-distended, bowel sounds present  Extremities: Warm, no edema, distal pulses +2, well perfused  Skin: No rash or  "lesion  Psych: Calm and cooperative  Neuro:  Mental status: Sedated.  Cranial nerves: Pupils reactive (R size 2 mm NPI 3.5 L size 2 mm NPI 3.3), Corneal absent, Cough/Gag not able to assess (biting the tube).  Motor: No movement 4/4 extremities.  Sensory: deferred.  Coordination: deferred.  Gait: MICKIE, deferred.    Labs and Imaging:    No results found for: \"PH\", \"PHARTERIAL\", \"PO2\", \"[id number]\", \"SAT\", \"PCO2\", \"HCO3\", \"BASEEXCESS\", \"BARBI\", \"BEB\"    All relevant imaging and laboratory values personally reviewed.    "

## 2024-02-18 NOTE — Clinical Note
Called to [facility] on [facility]. All questions answered. All belongings sent with patient. Patient transported to [facility] via stretcher with CCL RN, ICU RN, and ECMO specialist on all monitoring equipment.

## 2024-02-18 NOTE — Clinical Note
IABP inserted in the left femoral artery. IABP inserted with 50 cc balloon volume Lot number is: [id number]

## 2024-02-18 NOTE — PROGRESS NOTES
Patient came to cath lab by EMS after VT/VF, on ECMO and intubated.  Initial vent settings: Vent Mode: CMV/AC  (Continuous Mandatory Ventilation/ Assist Control)  FiO2 (%): 60 %  Resp Rate (Set): 12 breaths/min  Tidal Volume (Set, mL): 450 mL  PEEP (cm H2O): 7 cmH2O  Resp: 18    Jacquie Subramanian, RT

## 2024-02-18 NOTE — PROGRESS NOTES
[facility]    ECLS Cannulation Note:     Date on: 2/18/2024  Time on: 1241  Cannulating Physician: Nii  Pre-cannulation ABG: not done    Arterial Cannula: 17 Fr. In the Right Femoral Artery  Venous Cannula: 25 Fr. In the Right Femoral Vein  Distal Perfusion Cannula: 9 Fr. In the Right Superficial Femoral Artery    ECMO components include:  Console Serial Number: [id number]  Circuit Lot Number: [id number]  Oxygenator Lot Number: [id number]    Cannulation was performed in the Cath Lab, placement was verified by fluoroscopy, cannula position is good.    Patient's blood type is A, positive.    Dell Nava  ECMO Specialist  2/18/2024 5:10 PM

## 2024-02-18 NOTE — Clinical Note
Stent deployed in the middle left anterior descending. Max pressure = 11 mari. Total duration = 6 seconds.

## 2024-02-18 NOTE — PROVIDER NOTIFICATION
Per MD order ET tube advanced 3 cm, and secured 28 cm at lips. BS diminished, equal and bilateral.

## 2024-02-18 NOTE — Clinical Note
The first balloon was inserted into the left anterior descending and proximal left anterior descending.Max pressure = 12 mari. Total duration = 6 seconds.     Max pressure = 12 mari. Total duration = 4 seconds.

## 2024-02-18 NOTE — H&P
[facility]  Cardiology ICU History & Physical  2024          H&P:   Riki Duarte (Elie Gonsales ( 1960) is a 63 year old M who was transported to [facility] emergently with ongoing SHELLEY CPR after out-of-hospital refractory VT/VF cardiac arrest at 11.35h in the [employer] [facility]. Bystander CPR initiated. Received shock x 3, epi 1mg x2, amiodarone 30mg x 1, bicarb 1 amp without ROSC.    Witnessed arest (y/n): y  Home or public location (y/n): y  Bystander CPR (y/n): y  Time of 911 call: ?  Initial rhythm: VT/VF  Did they have intermittent ROSC (y/n): n  # of shocks: 6  Epi:  1 mg x2  Amio: 300  mg x 1  Bicarb:  1 amp    Initial rhythm in the cath lab: VT/VF  First ABG: ?  First Lactate 16  ECMO cannula size: 17/25           Assessment and Plan:       Neuro: #. At risk for anoxic brain injury  --Intubated and sedated. Wean sedation daily to assess neurological status.   --Maintain euthermia/warm by 0.5 C/hr to get to 37C  --continue versed, fentanyl  --RASS goal -3 to -4   --CT head ordered.     CV: #. Cardiogenic Shock s/p VA ECMO  #. ACUTE NSTEMI s/p HUI x 2 to prox to mid LAD  #. Refractory VF arrest  s/p VA ECMO  --Peripheral V-A ECMO inserted via RCFA (17 Fr) and RCFV (25 Fr).  --IABP: 1:1 with 100% augmentation.   --Pulsatility 30 once IABP is paused.   --Echo ordered  -- start cangrelol and overlap with ticagrelol load for 3h  --Continue ASA 81mg and ticagrelor 90mg BID  --Hold temp at normal   --Continue heparin drip. ACT goal 180-200.  --Hold lipitor for now given likely hepatic injury during arrest  --Hold ACE/ARB for now given likely reduced renal fxn after arrest  --Holding beta blocker given shock      Pulm: #. Acute hypoxic respiratory failure  #. Probable aspiration pneumonia  Resp: 18    --ETT in stable position    --CXR: Lines in stable position.   --Wean vent as able  --Daily CXR  --Continue Vancomycin and  Zosyn.  --MRSA nares - sent.  --Q2h ABGs for now     GI: #. Shock Liver 2/2 cardiac arrest  --Monitor LFTs twice a day.  --Consider RUQ US if LFTs do not improve.     Renal: #. STEPHANIE 2/2 cardiac arrest  --Monitor urine output  --Maintain K>3 and Mg>2      ID: #. Probable aspiration pneumonia  --Vancomycin/Zosyn x5 days for presumed aspiration pneumonia.  --MRSA nares sent. Stop Vanc if negative.   --Daily blood cultures.     Hem/Onc: #. Acute blood loss anemia due to ECMO cannulas.  --Continue Aspirin and Tacagrelor for the stent.  --Continue Heparin gtt for ECMO with ACT goal 180-200  --Cryo PRN fibrinogen < 150; FFP for INR >2  --Transfuse for Hgb<7  --US LE w/ arterial duplex per ECMO protocol   --DVT PPX: Heparin as above     Endo: #. Hyperglycemia  --Insulin as needed.  --HgbA1c - pending.     Checklist: ICU Checklist:  #Feeding: start in 48h   #Analgesia: fentanyl  #Sedation: na  #Thromboembolism ppx: heparin   #HOB: 30 degrees   #Ulcer ppx: protonix   #Glucose: 140-180  #SBT/SAT: daily   #Bowel reg: miralax  #Lines/tubes/drains:   #Code status: FULL  #Family: na   #Dispo: unit          Code Status: Full    The pt was discussed with the attending physician.       Thank you for allowing me to care for this patient, please don't hesitate to contact me with any questions regarding this plan.   Pt was discussed and evaluated with Dr. Toscano, attending physician, who agrees with the assessment and plan above.    Geovanny Ordoñez MD, PhD  Cardiology Fellow           Medications:   I have reviewed this patient's current medications    No past medical history on file.    No family history on file.  Social History     Socioeconomic History    Marital status: Not on file     Spouse name: Not on file    Number of children: Not on file    Years of education: Not on file    Highest education level: Not on file   Occupational History    Not on file   Tobacco Use    Smoking status: Not on file    Smokeless tobacco: Not on file  "  Substance and Sexual Activity    Alcohol use: Not on file    Drug use: Not on file    Sexual activity: Not on file   Other Topics Concern    Not on file   Social History Narrative    Not on file     Social Determinants of Health     Financial Resource Strain: Not on file   Food Insecurity: Not on file   Transportation Needs: Not on file   Physical Activity: Not on file   Stress: Not on file   Social Connections: Not on file   Interpersonal Safety: Not on file   Housing Stability: Not on file            Review of Systems:   Unable to obtain due to patients medical condition.            Physical Exam:   Resp 18       GENERAL: Intubated, sedated. NAD.  HEENT: No icterus. ETT in place, OG tube in place.  CARDIOVASCULAR: RRR. Normal S1 and S2.  RESP: Coarse bilaterally. Mechanical ventilation.    GI Soft, bowel sounds hypoactive but present.  GENITOURINARY: Collins in place.  EXTREMITIES: Cool, 1+ edema, pulses dopplered, as above.   NEURO: Sedated and intubated.  INTEGUMENTARY: No rashes. Cannula/Line sites CDI.      Resp: 18          Arterial Blood Gas: No lab results found in last 7 days.  There were no vitals filed for this visit.No intake/output data recorded.  No lab results found in last 7 days.  No components found for: \"URINE\"   No lab results found in last 7 days.     No data recorded   No lab results found in last 7 days.    Invalid input(s): \"NEUT\", \"LYM\", \"EOS\"  No lab results found in last 7 days.  No lab results found in last 7 days.    Lines:           Data:     Recent Results (from the past 24 hour(s))   Cardiac Catheterization    Narrative      Ost LAD to Mid LAD lesion is 100% stenosed.    Dist LAD lesion.    Left dominant circulation  Diffuse severe two vessel CAD  100% occlusion of proximal to mid LAD   Successful PCI to proximal to mid LAD with 2.90c81ns and 2.5x38mm Synergy   HUI. There was a ESTUARDO 1 flow in the diffusely severely diseased distal   LAD.  Uncomplicated  access with angiography " demonstrating an appropriate   insertion of the arterial sheath in the common femoral a above the   bifurcation and bellow the level of inf epigastric a.

## 2024-02-18 NOTE — Clinical Note
The first balloon was inserted into the left anterior descending and middle left anterior descending.Max pressure = 12 mari. Total duration = 3 seconds.     Max pressure = 12 mari. Total duration = 4 seconds.    Balloon reinflated a second time: Max pressure = 12 mari. Total duration = 4 seconds.

## 2024-02-18 NOTE — Clinical Note
Stent deployed in the proximal left anterior descending. Max pressure = 11 mari. Total duration = 6 seconds. Balloon reinflated a second time: Max pressure = 11 mari. Total duration = 4 seconds. Balloon reinflated a third time: Max pressure = 11 mari. Total duration = 4 seconds.

## 2024-02-19 NOTE — PROCEDURES
Cardiac ICU Procedure Note  Arterial Line Placement     Service performing procedure: Cardiac ICU  Indication for procedure:Hemodynamic monitoring, frequent ABGs    Acuity:Elective   Procedure date: 02/18/24     A Time Out was performed immediately prior to the procedure to confirm correct patient, procedure, and site (site marked if applicable): Yes     Preparation for Procedure:   Hand hygiene performed prior to insertion: yes   Barrier precautions used (mask, sterile gloves, cap): yes   Skin preparation with chlorhexidine gluconate: yes   Skin preparation agent was allowed to dry: yes   Anesthesia used? Local     Arterial line placed   Side:left/right: Right  Site:radial/femoral: radial   Ultrasound used? Yes  Type of catheter placed:arterial line   Number of attempts:1     The patient tolerated the procedure well except for complications as noted. Immediate complications:NONE     David Salguero MD  Cardiology Fellow  Pager: [phone]

## 2024-02-19 NOTE — PHARMACY-VANCOMYCIN DOSING SERVICE
Pharmacy Vancomycin Initial Note  Date of Service 2024  Patient's  1960  63 year old, male    Indication: Aspiration Pneumonia    Current estimated CrCl = Estimated Creatinine Clearance: 133 mL/min (A) (based on SCr of 0.66 mg/dL (L)).    Creatinine for last 3 days  2024:  3:06 PM Creatinine 0.66 mg/dL    Recent Vancomycin Level(s) for last 3 days  No results found for requested labs within last 3 days.      Vancomycin IV Administrations (past 72 hours)                     vancomycin (VANCOCIN) 1,750 mg in sodium chloride 0.9 % 250 mL intermittent infusion (mg) 1,750 mg New Bag 24 1912                    Nephrotoxins and other renal medications (From now, onward)      Start     Dose/Rate Route Frequency Ordered Stop    24 1800  DOBUTamine (DOBUTREX) 1,000 mg in D5W 250 mL infusion         10 mcg/kg/min × 80 kg (Dosing Weight)  12 mL/hr  Intravenous CONTINUOUS 24 1738      24 1700  vasopressin 1 unit/mL MAX Conc (PITRESSIN) infusion         0.5-4 Units/hr  0.5-4 mL/hr  Intravenous CONTINUOUS 24 1632      24 1600  vancomycin (VANCOCIN) 1,750 mg in sodium chloride 0.9 % 250 mL intermittent infusion         1,750 mg (central catheter)  over 90 Minutes Intravenous ONCE 24 1532      24 1430  norepinephrine (LEVOPHED) 16 mg in  mL infusion MAX CONC CENTRAL LINE         0.01-0.6 mcg/kg/min × 80 kg (Dosing Weight)  0.8-45 mL/hr  Intravenous CONTINUOUS 24 1406              Contrast Orders - past 72 hours (72h ago, onward)      Start     Dose/Rate Route Frequency Stop    24 1430  iopamidol (ISOVUE-300) IV solution 61%           ONCE PRN              [employer] Prediction of Planned Initial Vancomycin Regimen  N/A -- dosed per consult for cardiac arrest - 20mg/kg q24h. Will continue with AUC monitoring/dosing if continue > 48 hours           Plan:  Start vancomycin  1750 mg IV x1, then 1500 mg IV q24h (20mg/kg q24h recommended in consult  post-arrest).   Vancomycin monitoring method: AUC  Vancomycin therapeutic monitoring goal: 400-600 mg*h/L  Pharmacy will check vancomycin levels as appropriate in 1-3 Days.    Serum creatinine levels will be ordered daily for the first week of therapy and at least twice weekly for subsequent weeks.      Kathleen Man [facility]

## 2024-02-19 NOTE — PROGRESS NOTES
Cardiology ICU Progress Note    Brief HPI:  Riki Peters [place] (Elie Gonsales ( 1960) is a 63 year old M who was transported to [facility] emergently with ongoing SHELLEY CPR after out-of-hospital refractory VT/VF cardiac arrest at 11.35h in the [employer] store. Bystander CPR initiated. Received shock x 3, epi 1mg x2, amiodarone 30mg x 1, bicarb 1 amp without ROSC.     Subjective and Interval:  Over the night lost pulses in LLE with increased mottling of leg. Lost cardiac pulsatility, had ST elevations which converted to junctional rhythm and then to asystole. ECHO Completed and in cardiac stand still. Maintaining perfusion and arterial pressure with ECMO circiut flowing at 4.5 L and epinephrine, norepinephrine, vasopressin and dobutamine. ACT goal 180-200. Julio C hugger to legs    Assessment and plan by system:   Today's changes:  - Trend hemoglobin  - temp pacer placement  - maximize flow on ECMO circuit  - keep -200 if possible due to loss of pulsatility  - Give time for neuro recovery     Neurology   # Concern for anoxic brain injury    CT head: no intracranial pathology  - Intubated. Avoiding hyperthermia  - Neuro-crit consulted and appreciate recommendations.   - Palliative care consulted and appreciate assistance    Current sedation:  No sedation or pain medication    Plan:  - Continue sedation with a RASS goal 0--1  - Spontaneous awakening trial as tolerated  - permissive hypercapnea and hypernatremia for neuroprotection     Cardiovascular  # Refractory VF/ VT Cardiac arrest   # Cardiogenic shock requiring VA ECMO  # CAD 2 stents to p-mLAD  # Cardiomyopathy  # Dyslipidemia  Peripheral V-A ECMO inserted via  RFA  17 Fr, RFV  25 fr, RSFA perfusion catheter  Angiogram: Ost LAD to Mid LAD lesion is 100% stenosed, Dist LAD lesion. Two stents placed to p-m LAD  TTE: Cardiac standstill, no clots noted in ventricle, Small circumferential pericardial effusion is present  without any hemodynamic  significance.  ECG Rhythm: Asystole    ECMO:  Mode: V-A   Pump Type: Cardiohelp  RPM's: 3600  Blood Flow (Circuit) LPM: 4.2 LPM  Sweep LPM: 3 LPM  Sweep FiO2   %: 60 %  Venous Pressure  mmHg: -36 mmHg  Arterial Pressure  mmH mmHg  Internal Pressure mmH mmHg  Pressure Change mmH mmHg    Current Pressors/inotropes/antiarrythmic:   , Dose (mcg/kg/min) Norepinephrine: 0.15 mcg/kg/min, Dose (units/hr) Vasopressin: 1 Units/hr, Dose (mcg/kg/min) DOBUTamine: 10 mcg/kg/min,  , Dose (mcg/kg/min) Epinephrine: 0.05 mcg/kg/min,  ,  ,  , and None    Hemodynamics:  Art Line  Arterial Line BP: 95/45  Arterial Line MAP (mmHg): 78 mmHg      Plan:  - Continue ASA 81mg and ticagrelor 90mg BID   - Hold lipitor for now given shock liver  - Hold ACE/ARB for now given likely reduced renal fxn after arrest  - Holding beta blocker given shock  - Telemetry monitoring  - Trending troponin daily   - Continue ECMO support  - Flow ECMO as high as arterial pressures allow, max 350     Pulmonary  # Acute hypoxemic respiratory failure  # Aspiration pneumonia  CAP CT  right middle and upper lobe opacity. Moderate pericardial effusion  CXR: Pulmonary edema.  Lines in appropriate positions. Venous ecmo cannula in good position    Vent Settings:   Vent Mode: PCV Plus assist  (Pressure Control Ventilation/ Assist Control)  FiO2 (%): 40 %  Resp Rate (Set): 12 breaths/min  Tidal Volume (Set, mL): 300 mL  PEEP (cm H2O): 7 cmH2O  Inspiratory Pressure (cm H2O) (Drager Zakiya): 25  Resp: 12      ABG:   Recent Labs   Lab 24  0812 24  0607 24  0359 24  0358   PH 7.48* 7.39  --  7.51*   PCO2 42 51*  --  34*   PO2 280* 298*  --  289*   HCO3 31* 31*  --  27   O2PER 40 40 80  40 40       Plan:  - Wean vent as able  - Daily CXR  - Serial ABGs   - Consider scheduled duonebs if signs of lung dz, currently PRN    - Peridex    Gastrointestinal, Nutrition  # Shock liver 2/2 cardiac arrest  # Hypoalbuminemia    # At risk for protein malnutrition   No known medical hx.   CAP CT  descending colon large, stool filled   Diet: NPO in immediate post arrest setting  BM:    Recent Labs   Lab 24  0953 24  0359 24   * 156* 157*   ALT 26 22 21   BILITOTAL 1.1 0.6 0.8   ALBUMIN 3.7 3.3* 3.3*   PROTTOTAL 5.5* 5.1* 5.1*   ALKPHOS 139 110 101       Plan:  - Monitor LFTs  - Bowel regimen in place  - GI Prophylaxis: PPI; Protonix 40 mg daily  - Nutrition consulted, appreciate recommendations      Renal, Electrolytes  # Acute Renal Injury   # Lactic acidosis  Baseline CR 0.59  UO decreasing      Intake/Output Summary (Last 24 hours) at 2024 1135  Last data filed at 2024 1000  Gross per 24 hour   Intake 7385.77 ml   Output 1353 ml   Net 6032.77 ml       Recent Labs   Lab 24  0953 24  0359 24    143 144   POTASSIUM 4.4 3.7 2.8*   CHLORIDE 100 98 99   CO2 29 25 28   BUN 24.6* 21.1 16.9   CR 0.91 0.79 0.59*   PHOS  --  5.0*  --    MAG 3.2* 2.8* 2.4*       Plan:  - Avoid nephrotoxins, renally dose meds  - Monitor urine output  - FBG and diuresis as above    Infectious Disease  # Aspiration Pneumonia  # Leukocytosis  Temp (24hrs), Av.7  F (37.1  C), Min:97.3  F (36.3  C), Max:99.3  F (37.4  C)      Cultures thus far:   BLD NGTD   Sputum 4+ Enterobacter cloacae complex   MRSA negative   COVID negative  Antibiotics     Anti-infectives (From now, onward)      Start     Dose/Rate Route Frequency Ordered Stop    24 1600  cefTRIAXone (ROCEPHIN) 2 g vial to attach to  ml bag for ADULTS or NS 50 ml bag for PEDS         2 g  over 30 Minutes Intravenous EVERY 24 HOURS 24 1515               Recent Labs   Lab 24  0953 24  0359 247   WBC 27.8* 24.1* 27.8*                    Plan:  - Continue CTX x 5D for aspiration coverage  - Monitor for signs of infection  - Avoid fevers     Hematology  # Anemia of critical illness  Hgb stable. No e/o  bleeding.  US LE w/ Arterial Duplex with no evidence of acute thrombus    Receiving heparin for ECMO with ACT goal 180-200  Dose (units/hr) HEParin: 700 Units/hr  ACT  (seconds): 209 seconds    Recent Labs   Lab 02/19/24  0953 02/19/24  0359 02/18/24 2157   HGB 7.1* 6.4* 7.7*   HCT 22.4* 20.3* 24.0*    189 209     Recent Labs   Lab 02/19/24  0953 02/19/24  0359 02/18/24  2157   INR 2.05* 2.03* 2.11*   * 118* 73*       Plan:  - DVT PPX: Heparin with ACT goal as above  - DAPT ASA/ticagrelor for HUI  - Transfusion parameters:   - 1u PRBC for hbg < 7.0   - 1u platelet for plt < 50   - 1u FFP for INR > 3   - 5u cryoprecipitate for fibrinogen <150     Endocrinology  # Hyperglycemia   # A1C   Recent Labs   Lab 02/18/24  1806   A1C 7.2*     - No known medical history.     Recent Labs   Lab 02/19/24  0959 02/19/24  0953 02/19/24  0812 02/19/24  0651   * 152*  161* 175* 182*       Dose (units/hr) Insulin: 4 Units/hr    Plan  - insulin gtt as needed    Integumentary:  # Present on admission Bilateral IT Pressure Wounds Stage 3-4   # Present on admission Sacrum/Coccyx Pressure Wound Unstageble     Plan  WOC consult  Wound care per WOC    Lines/Tubes/Drains:  Peripheral IV 02/18/24 Anterior;Distal;Right Upper arm (Active)   Site Assessment [facility] 02/19/24 0800   Line Status Infusing 02/19/24 0800   Dressing Transparent 02/19/24 0800   Dressing Status clean;dry;intact 02/19/24 0800   Phlebitis Scale 0-->no symptoms 02/19/24 0800   Infiltration? no 02/19/24 0800   Number of days: 1       Arterial Line 02/18/24 Radial (Active)   Site Assessment [facility] 02/19/24 0800   Line Status Pulsatile blood flow 02/19/24 0800   Art Line Waveform Appropriate 02/19/24 0800   Art Line Interventions Leveled;Tubing changed;Connections checked and tightened;Flushed per protocol;Zeroed and calibrated 02/19/24 0800   Color/Movement/Sensation Capillary refill less than 3 sec 02/19/24 0800   Line Necessity Yes, meets criteria 02/19/24  0800   Dressing Type Transparent 02/19/24 0800   Dressing Intervention Dressing changed/new dressing 02/19/24 0800   Dressing Change Due 02/25/24 02/19/24 0800   Number of days: 1       CVC Triple Lumen Left Femoral Non - valved (open ended);Non - tunneled (Active)   Site Assessment [facility] 02/19/24 0800   Dressing Chlorhexidine disk;Transparent 02/19/24 0800   Dressing Status clean;dry;intact 02/19/24 0800   Dressing Change Due 02/25/24 02/19/24 0800   Line Necessity Yes, meets criteria 02/19/24 0800   Phlebitis Scale 0-->no symptoms 02/19/24 0800   Infiltration? no 02/18/24 1600   CVC Comment cdi 02/19/24 0800   Number of days: 1       Left Groin Interventional Procedure Access (Active)   Site Assessment [facility] 02/19/24 0800   Hemostasis management Unchanged 02/19/24 0800   Femoral Bruit not present 02/19/24 0800   CMS Left Extremity WDL Except 02/19/24 0800   Dorsalis Pulse - Left Leg Absent 02/19/24 0800   Posterior Tibial Pulse - Left Leg Absent 02/19/24 0800   Number of days: 1       ECMO Cannula Arterial Right femoral artery (Active)   Site Assessment [facility] 02/19/24 0800   Dressing Type [place] 02/19/24 0800   Dressing Status dry;old drainage;intact 02/19/24 0800   Site Intervention No intervention needed 02/19/24 0800   Number of days:        ECMO Cannula Venous Right femoral vein (Active)   Site Assessment [facility] 02/19/24 0800   Dressing Type [place] 02/19/24 0800   Dressing Status dry;intact;old drainage 02/19/24 0800   Site Intervention No intervention needed 02/19/24 0800   Number of days:        Distal Perfusion Catheter Right superficial femoral artery (Active)   Site Assessment [facility] 02/19/24 0800   Dressing Type [place] 02/19/24 0800   Dressing Status dry;intact;old drainage 02/19/24 0800   Site Intervention No intervention needed 02/19/24 0800   Number of days:        Intraosseous Line 02/18/24 Anterior;Proximal;Right Tibia (Active)   Line Status Infusing 02/19/24 0400   Site Assessment [facility] 02/19/24 0800   Infiltration  Scale 0 02/19/24 0800   Infiltration Site Treatment Method  None 02/18/24 1600   Dressing Type Transparent 02/19/24 0800   Dressing Status clean;dry;intact 02/19/24 0800   Number of days: 1       ETT Cuffed 7 mm (Active)   Secured at (cm) 28 cm 02/19/24 0848   Measured from Lips 02/19/24 0848   Position Left 02/19/24 0800   Secured by Commercial tube triplett 02/19/24 0848   Bite Block Present 02/19/24 0848   Site Appearance Clean;Dry 02/19/24 0848   Cuff Assessment Minimal leak technique 02/19/24 0848   Safety Measures Manual resuscitator/PEEP valve in room 02/19/24 0848   Number of days: 1       NG/OG/NJ Tube Orogastric Center mouth (Active)   Site Description WDL 02/19/24 0800   Status Suction-low intermittent 02/19/24 0800   Drainage Appearance WDL except;[place] 02/19/24 0000   Placement Confirmation [place] unchanged 02/19/24 0800   [place] (cm marking) at nare/mouth 65 cm 02/19/24 0800   Intake (ml) 45 ml 02/19/24 0800   Flush/Free Water (mL) 30 mL 02/19/24 0800   Container Amount 500 mL 02/19/24 1000   Output (ml) 0 ml 02/19/24 1000   Number of days: 1       Rectal Tube With balloon (Active)   Balloon fill volume  45 cc 02/18/24 1600   Stool Leakage Small 02/19/24 0800   Rectal Tube Container Volume 200 ml 02/19/24 1000   Rectal Tube Output 0 ml 02/19/24 1000   Number of days: 1       Urethral Catheter 02/18/24 (Active)   Tube Description Positional 02/19/24 0800   Catheter Care Done;Catheter wipes 02/19/24 0800   Collection Container Standard 02/19/24 0800   Securement Method Securing device (Describe) 02/19/24 0800   Rationale for Continued Use ICU only: hourly urine output needed for patient care 02/19/24 0800   Urine Output 18 mL 02/19/24 1000   Number of days: 1       Wound Sacrum (Active)   Wound Bed Red;Moist;Pale;[place] 02/18/24 1600   Drainage Amount Moderate 02/18/24 1600   Dressing Moist gauze 02/18/24 1600   Amount of Packing Added 1 02/18/24 1600   Dressing Status Clean, dry, intact;Changed 02/18/24  "1600   Dressing Change Due 02/19/24 02/18/24 1600   Number of days: 1       Wound Thigh (Active)   Wound Bed [place] 02/18/24 1600   Drainage Amount Small 02/18/24 1600   Dressing Moist gauze 02/18/24 1600   Amount of Packing Added 1 02/18/24 1600   Dressing Status Clean, dry, intact;Changed 02/18/24 1600   Dressing Change Due 02/19/24 02/18/24 1600   Number of days: 1       Wound Thigh (Active)   Wound Bed [place] 02/18/24 1600   Drainage Amount Small 02/18/24 1600   Dressing Moist gauze 02/18/24 1600   Amount of Packing Added 1 02/18/24 1600   Dressing Status Clean, dry, intact;Changed 02/18/24 1600   Dressing Change Due 02/19/24 02/18/24 1600   Number of days: 1          ICU  Feeding: NPO 48 hours  Analgesia:  None  Sedation: None   Thrombopx: Heparin gtt   Head of bed: reverse trend   Ulcers: PPI  Glucose: Insulin gtt      Family unknown    Patient seen, discussed, and plan developed with staff physician.    Sneha Celestin DNP APRN [facility]  Cardiology ICU  Pager [phone]    Objective:  Most recent vital signs:  BP (!) 109/96   Pulse (!) 0   Temp 99.3  F (37.4  C)   Resp 12   Ht 1.753 m (5' 9\")   Wt 80 kg (176 lb 5.9 oz)   SpO2 100%   BMI 26.05 kg/m    Temp:  [97.3  F (36.3  C)-99.3  F (37.4  C)] 99.3  F (37.4  C)  Pulse:  [0-126] 0  Resp:  [0-24] 12  BP: ()/() 109/96  MAP:  [50 mmHg-95 mmHg] 78 mmHg  Arterial Line BP: ()/(42-54) 95/45  FiO2 (%):  [40 %-60 %] 40 %  SpO2:  [93 %-100 %] 100 %      Physical exam:  General: In bed, in NAD, unkept  HEENT: PERRL, no scleral icterus or injection  CARDIAC: RRR, no m/r/g appreciated. Peripheral pulses RLE dopplered, LLE non dopplerable  RESP: Mechanical ventilation; CTAB, no wheezes, rhonchi or crackles appreciated.  GI: soft, BS hypoactive  : [place]  EXTREMITIES: No LE edema, L leg cool,  R leg warm Femoral access site w/o bleeding, dressing c/d/i.   SKIN: wounds on back and buttock, fungus on feet, toe nails going into feet  NEURO: " Intubated and sedated    Imaging/procedure results:

## 2024-02-19 NOTE — CARE PLAN
Writer and bedside nurse went through patient belongings. Patient label placed on phone and kept at bedside. Rings, money and iPad sent to security.

## 2024-02-19 NOTE — PROGRESS NOTES
Neurocritical Care Consultation    Reason for critical care admission: Cardiac Arrest  Admitting Team: CICU  Date of Service:  02/19/2024  Date of Admission:  2/18/2024  Hospital Day: 2    Assessment/Plan  Alexi Delgadillo is a 63 year old male with unknown past medical history admitted on 2/18/2024 for out-of-hospital refractory VT/VF cardiac arrest at 11:35AM in the [facility] [employer], Bystander initiated CPR, and he was BIBA to the [facility]. Coronary angiogram revealed LAD occlusion with successful PCI x1 HUI (ESTUARDO 1). NCC was consulted for neuroprognostication.     24 hour events: Patient remains on Vent with multiple pressors infusing.  Patient's balloon pump was removed overnight around 0200. VA ECMO continues. EEG Study consistent with a severe diffuse encephalopathy.    Neuro  #post-arrest cerebral edema  #hypoxic encephalopathy 2.2 cardiac arrest  #stimulus-induced jaw closing movements concern for epileptiform activity  Post arrest day: 1  Length of downtime (time to cannulation): Approximately 1 hour (Procedure starting time at 12:35 PM)  -Witnessed arrest: Yes  -ROSC: No, rhythm: VT/VF  -TTM initiated: No  -Current temp: 37.4 C      Analgesics & sedation  Prior sedation: midazolam / fentanyl  Current sedation: none     Exam findings   -Cough: none  -Gag: none  -Facial twitching present  -Pupils: R size 2 mm NPI 0, L size 2 mm NPI 3.2, pupillometry at 1200     Neuroimaging  -Day 1 CTH shows: diffuse cerebral edema  -Repeat CTH on day 3      Neurophysiology  -Continue vEEG with VA ECMO  -vEEG shows: Study consistent with a severe diffuse encephalopathy      Biomarkers  -Neuron-specific Enolase (NSE) results: pending  -S 100B protein: pending     Recommendations  -Follow up EEG initial read for further up titration to burst-suppression if epileptiform activity is present  -Avoid hypotonic solutions as they may worsen cerebral edema   -Avoid nitroprusside or nitroglycerin as they may also worsen cerbral  "edema  -Advise slow correction of hypernatremia if needed  -When safe to do so, please limit use of CNS acting medications such as benzodiazepines, opiates, anticholinergics, which will permit a more accurate neurological assessment  -We will continue to follow, please call [phone] with any questions    Clinically Significant Risk Factors Present on Admission        # Hypokalemia: Lowest K = 2.5 mmol/L in last 2 days, will replace as needed  # Hypernatremia: Highest Na = 146 mmol/L in last 2 days, will monitor as appropriate  # Hypocalcemia: Lowest iCa = 3.9 mg/dL in last 2 days, will monitor and replace as appropriate    # Anion Gap Metabolic Acidosis: Highest Anion Gap = 27 mmol/L in last 2 days, will monitor and treat as appropriate  # Hypoalbuminemia: Lowest albumin = 2.8 g/dL at 2/18/2024  3:06 PM, will monitor as appropriate  # Coagulation Defect: INR = 2.05 (Ref range: 0.85 - 1.15) and/or PTT = 104 Seconds (Ref range: 22 - 38 Seconds), will monitor for bleeding      # Circulatory Shock: required vasopressors within past 24 hours    # Acute Respiratory Failure: based on blood gas results.  Continue supplemental oxygen as needed    # DMII: A1C = 7.2 % (Ref range: <5.7 %) within past 6 months    # Overweight: Estimated body mass index is 26.05 kg/m  as calculated from the following:    Height as of this encounter: 1.753 m (5' 9\").    Weight as of this encounter: 80 kg (176 lb 5.9 oz).           # Coma: based on GCS score of <8  # Cerebral edema       TIME SPENT ON THIS ENCOUNTER   I spent 45 minutes of critical care time on the unit/floor managing the care of Alexi Delgadillo. Upon evaluation, this patient had a high probability of imminent or life-threatening deterioration due to hypoxia encephalopathy, which required my direct attention, intervention, and personal management . Greater than 50% of my time was spent at the bedside counseling the patient and/or coordinating care including chart review, " history, exam, documentation, and further activities per this note. I have personally reviewed the following data/imaging over the past 24 hours.     The patient was seen and discussed with the [facility] attending, Dr. Johann Medina.    DEMETRIA Julio, [facility]  Neurosurgery  Pager [phone]      History of Present Illness:  Alexi Delgadillo is a 63 year old male with unknown past medical history admitted on 2/18/2024 for out-of-hospital refractory VT/VF cardiac arrest at 11:35AM in the [facility], Bystander initiated CPR, and he was BIBA to the [facility]. Coronary angiogram revealed LAD occlusion with successful PCI x1 HUI (ESTUARDO 1). VA ECMO was initiated on 2/19/2024 at 1235.     Not on File    Current Medications:   albumin human  25 g Intravenous Q4H    artificial tears   Both Eyes Q8H    aspirin  81 mg Oral or Feeding Tube Daily    cefTRIAXone  2 g Intravenous Q24H    hydrocortisone sodium succinate PF  50 mg Intravenous Q6H    pantoprazole  40 mg Intravenous Daily    ticagrelor  90 mg Oral or Feeding Tube BID       PRN Medications:  bisacodyl, calcium chloride, dextrose, glucose **OR** dextrose **OR** glucagon, fentaNYL (PF), fentaNYL, heparin ANTICOAGULANT, iopamidol, lidocaine 4%, lidocaine (buffered or not buffered), magnesium sulfate, magnesium sulfate, - MEDICATION INSTRUCTIONS -, midazolam, midazolam, naloxone **OR** naloxone **OR** naloxone **OR** naloxone, - MEDICATION INSTRUCTIONS -, polyethylene glycol, potassium chloride, senna-docusate **OR** senna-docusate, sodium chloride (PF), sodium phosphate 10 mmol in sodium chloride 0.9 % 250 mL intermittent infusion, sodium phosphate, sodium phosphate 20 mmol in sodium chloride 0.9 % 250 mL intermittent infusion, sodium phosphate 25 mmol in sodium chloride 0.9 % 500 mL intermittent infusion    Infusions:   dextrose      DOBUTamine 10 mcg/kg/min (02/19/24 1000)    EPINEPHrine 0.1 mcg/kg/min (02/19/24 1000)    fentaNYL Stopped (02/18/24 1801)    heparin 2 unit/mL  "in 0.9% NaCl 3 mL/hr (02/19/24 1000)    HEParin 700 Units/hr (02/19/24 1000)    insulin regular 4 Units/hr (02/19/24 1000)    - MEDICATION INSTRUCTIONS -      norepinephrine 0.15 mcg/kg/min (02/19/24 1000)    - MEDICATION INSTRUCTIONS -      vasopressin 1 Units/hr (02/19/24 1000)       Physical Examination:  Vitals: BP (!) 109/96   Pulse (!) 0   Temp 99.3  F (37.4  C)   Resp 12   Ht 1.753 m (5' 9\")   Wt 80 kg (176 lb 5.9 oz)   SpO2 100%   BMI 26.05 kg/m    General: Adult male patient, lying in bed, critically-ill  HEENT: Normocephalic, atraumatic, no icterus, oral cavity/oropharynx pink and moist  Cardiac: VA ECMO  Pulm: Ventilator   Abdomen: firm non-distended, bowel sounds present  Extremities: Warm, no edema, distal pulses +1  Skin: No rash or lesion  Psych: Calm a  Neuro:  Mental status: unresponsive  Cranial nerves: corneal reflexes present, no gag or cough present.   Motor: none movement present  Sensory: unable to examine   Coordination: unable to examine      Labs and Imaging:    No results found for: \"NTBNPI\", \"NTBNP\"  Lab Results   Component Value Date    WBC 27.8 (H) 02/19/2024    HGB 7.1 (L) 02/19/2024    HCT 22.4 (L) 02/19/2024    MCV 82 02/19/2024     02/19/2024     Lab Results   Component Value Date    INR 2.05 (H) 02/19/2024     Narrative & Impression   CT HEAD W/O CONTRAST 2/18/2024 2:55 PM     Provided History: s/p cardiac arrest on ECMO     Comparison: None.     Technique: Using multidetector thin collimation helical acquisition  technique, axial, coronal and sagittal CT images from the skull base  to the vertex were obtained without intravenous contrast.      Findings:       There is no evidence of intracranial hemorrhage, mass effect, or  midline shift. Gray-white matter differentiation in both cerebral  hemispheres is preserved. Ventricles are proportionate to the cerebral  sulci. Intact bony calvaria and skull base. Scalp soft tissues are  unremarkable. Mastoid air cells are " clear. Layering fluid within the  nasal cavity and scattered paranasal sinus mucosal thickening. Orbits  are unremarkable.                                                                      Impression: No acute intracranial hemorrhage. No evidence of diffuse  or focal loss of gray-white differentiation to suggest acute focal  infarct or hypoxic ischemic injury.        All relevant imaging and laboratory values personally reviewed.

## 2024-02-19 NOTE — PROGRESS NOTES
Attempted to advance ETT 1cm per md order. Patient quickly dropped to 70's. ETT retracted back to original position and sats, after 100% O2 boost, returned to above 90%. RN aware.

## 2024-02-19 NOTE — PROGRESS NOTES
[facility]    ECLS Shift Summary:     ECMO Equipment:  Console Serial Number: [id number]  Circuit Lot Number: [id number]  Oxygenator Lot Number: [id number]    Circuit Assessment: Fibrin  Fibrin Location: connectors    Arterial ECMO Cannula: 17 Fr in the Right Femoral Artery  Venous ECMO Cannula: 25 Fr in the Right Femoral Vein    Patient remains on V-A ECMO, all equipment is functioning and alarms are appropriately set. RPM's: 3100 with Blood Flow (Circuit) LPM  Avg: 3.7 LPM  Min: 3.09 LPM  Max: 4.05 LPM L/min. Sweep is at 2 LPM and 80 %. Extremities are cool.     Significant Shift Events: IABP removed for worsening perfusion to LLE. RPM/flow increased for low MAPS and no pulsatility following removal of IABP. Asystole ~0215.     Vent settings:  Vent Mode: PCV Plus assist  (Pressure Control Ventilation/ Assist Control)  FiO2 (%): 40 %  Resp Rate (Set): 12 breaths/min  Tidal Volume (Set, mL): 300 mL  PEEP (cm H2O): 7 cmH2O  Inspiratory Pressure (cm H2O) (Drager Zakiya): 25  Resp: 12      Anticoagulation:  Dose (units/hr) HEParin: 850 Units/hr  Rate (mL/hr) HEParin: 8.5 mL/hr  Concentration HEParin: 100 Units/mL        Most recent: ACT  (seconds): 205 seconds    Urine output is decreasing since 0000.  Blood loss was minimal. Product given included 1 unit PRBC.     Intake/Output Summary (Last 24 hours) at 2/19/2024 0550  Last data filed at 2/19/2024 0400  Gross per 24 hour   Intake 6390.06 ml   Output 1285 ml   Net 5105.06 ml       Labs:  Recent Labs   Lab 02/19/24  0359 02/19/24  0358 02/19/24  0205 02/19/24  0050 02/18/24  2157   PH  --  7.51* 7.57* 7.49* 7.52*   PCO2  --  34* 33* 37 38   PO2  --  289* 355* 248* 352*   HCO3  --  27 30* 28 31*   O2PER 80  40 40 40 40 40       Lab Results   Component Value Date    HGB 6.4 (LL) 02/19/2024    PHGB <30 02/19/2024     02/19/2024    FIBR 423 02/19/2024    INR 2.03 (H) 02/19/2024     (H) 02/19/2024    DD 12.33 (H)  02/19/2024       Plan is to continue [facility] ECMO support.    Beverly Rincon RN  ECMO Specialist  2/19/2024 5:50 AM

## 2024-02-19 NOTE — CONSULTS
Alexi Delgadillo is a 63 year old M who was transported to [facility] emergently with ongoing SHELLEY CPR after out-of-hospital refractory VT/VF cardiac arrest at 11.35h in the [place] [employer]. Bystander CPR initiated. Received shock x 3, epi 1mg x2, amiodarone 30mg x 1, bicarb 1 amp without ROSC. Noted to have acute NSTEMI, now s/p HUI x 2 to prox to mid LAD. EEG showing diffuse encephalopathy. 2/18 overnight night lost pulses in LLE with increased mottling of leg. Lost cardiac pulsatility, had ST elevations which converted to junctional rhythm and then to asystole. ECHO Completed and in cardiac stand still. Maintaining perfusion and arterial pressure with ECMO circiut flowing at 4.5 L and epinephrine, norepinephrine, vasopressin and dobutamine.     Palliative consulted for support/goals of care discussions. Staff have been unable to locate next of kin.     Ethics consulted 2/19 for decision-making in patient on ECMO s/p arrest who is currently unrepresented. Recommendations per note:   Continue searching for surrogate decision-maker - does not need to be next of kin in this case, but rather any person who knows his goals/values and is willing to serve in this capacity.   If unable to find surrogate decision-maker, care team would have to make the decision based on:   The obligation not to perform interventions where the risks and burdens of treatment outweigh opportunities for benefit   AND the obligation to manage a patient's symptoms.     Palliative will follow peripherally. We will complete the consult if/when next of kin are identified. We would also be happy to participate further in discussions between the care teams if deemed helpful.      This is a non-billable note    DEMETRIA Cantrell CNP  Securely message with Vocera (more info)  Text page via [employer] Paging/Directory

## 2024-02-19 NOTE — PROGRESS NOTES
[facility]    ECLS Shift Summary:     ECMO Equipment:  Console Serial Number: [id number]  Circuit Lot Number: [id number]  Oxygenator Lot Number: [id number]    Circuit Assessment: Free of fibrin, clot, and air    Arterial ECMO Cannula: 17 Fr in the Right Femoral Artery  Venous ECMO Cannula: 25 Fr in the Right Femoral Vein  ECMO Cannula Arterial Right femoral artery-Site Assessment: WDL  ECMO Cannula Venous Right femoral vein-Site Assessment: WDL  Distal Perfusion Catheter Right superficial femoral artery-Site Assessment: WDL  ECMO Cannula Arterial Right femoral artery-Site Intervention: No intervention needed  ECMO Cannula Venous Right femoral vein-Site Intervention: No intervention needed  Distal Perfusion Catheter Right superficial femoral artery-Site Intervention: No intervention needed    Patient remains on V-A ECMO, all equipment is functioning and alarms are appropriately set. RPM's: (S) 3500 with Blood Flow (Circuit) LPM  Av LPM  Min: 3.96 LPM  Max: 4.05 LPM L/min. Sweep is at 5 LPM and 100 %. Extremities are perfused, LLE is mottled.     Significant Shift Events: Multiple bouts of chugging, requiring volume.    Vent settings:  Vent Mode: CMV/AC  (Continuous Mandatory Ventilation/ Assist Control)  FiO2 (%): 60 %  Resp Rate (Set): 12 breaths/min  Tidal Volume (Set, mL): 300 mL  PEEP (cm H2O): 7 cmH2O  Resp: 12      Anticoagulation:  Dose (units/hr) HEParin: 800 Units/hr  Rate (mL/hr) HEParin: 8 mL/hr  Concentration HEParin: 100 Units/mL        Most recent: ACT  (seconds): 174 seconds      Blood loss was minimal. No product was given.     Intake/Output Summary (Last 24 hours) at 2024  Last data filed at 2024 2100  Gross per 24 hour   Intake 5565.45 ml   Output 810 ml   Net 4755.45 ml       Labs:  Recent Labs   Lab 24  2019 24  1506   PH 7.56*  --    PCO2 34*  --    PO2 361*  --    HCO3 30*  --    O2PER 60 60  60  60       Lab Results    Component Value Date    HGB 10.8 (L) 02/18/2024    PHGB 30 (H) 02/18/2024     02/18/2024    FIBR 618 (H) 02/18/2024    INR 1.76 (H) 02/18/2024    PTT >240 (HH) 02/18/2024    DD >20.00 (HH) 02/18/2024       Plan is to continue VA ECMO.    Dell Nava  ECMO Specialist  2/18/2024 9:15 PM

## 2024-02-19 NOTE — PROCEDURES
Intraaortic Balloon Pump Removal    Patient was intubated and sedated throughout the procedure.  The heparin gtt was d/c'd prior to removal of IABP.  His BP was stable w/ pump momentarily on standby immediately prior to removal.  The balloon pump and sheath were removed w/o issue. Manual pressure was held and hemostasis achieved. The groin site was c/d/i and vital signs were stable. RLE mottled with no pulses (similar to prior to removal). No clot was visible on the balloon pump after removal. Trace blood loss during procedure.     David Salguero MD  Cardiology Fellow  Pager: [phone]

## 2024-02-19 NOTE — PLAN OF CARE
Goal Outcome Evaluation:      Plan of Care Reviewed With: other (see comments)          Outcome Evaluation: Care management will follow plan of care.

## 2024-02-19 NOTE — PROGRESS NOTES
ECMO Attending Progress Note  2024    Alexi Delgadillo is a 63 year old male who was cannulated for ECMO 2024 due to refractory VF arrest    Cannulation Site:  17 Fr in the R femoral artery  25 Fr in the R femoral vein    Interval events: went into asystole last PM, pressure stable with pressor, IABP removed due to leg ischemia    Pulsatilty (IABP paused if applicable): 0 mmHG     Physical Exam:  Temp:  [97.3  F (36.3  C)-99.3  F (37.4  C)] 99.3  F (37.4  C)  Pulse:  [0-126] 0  Resp:  [0-24] 12  BP: ()/() 109/96  MAP:  [50 mmHg-95 mmHg] 70 mmHg  Arterial Line BP: ()/(42-54) 95/45  FiO2 (%):  [40 %-60 %] 40 %  SpO2:  [93 %-100 %] 100 %    Intake/Output Summary (Last 24 hours) at 2024 1248  Last data filed at 2024 1200  Gross per 24 hour   Intake 7449.57 ml   Output 1353 ml   Net 6096.57 ml        Vent Mode: PCV Plus assist  (Pressure Control Ventilation/ Assist Control)  FiO2 (%): 40 %  Resp Rate (Set): 12 breaths/min  Tidal Volume (Set, mL): 300 mL  PEEP (cm H2O): 7 cmH2O  Inspiratory Pressure (cm H2O) (Drager Zakiya): 25  Resp: 12       Labs:  Recent Labs   Lab 24  0812 24  0607 24  0359 24  0358 24  0205   PH 7.48* 7.39  --  7.51* 7.57*   PCO2 42 51*  --  34* 33*   PO2 280* 298*  --  289* 355*   HCO3 31* 31*  --  27 30*   O2PER 40 40 80  40 40 40      Recent Labs   Lab 24  0953 24  0359 24  2157 24  1506   WBC 27.8* 24.1* 27.8* 27.9*   HGB 7.1* 6.4* 7.7* 10.8*     Creatinine   Date Value Ref Range Status   2024 0.91 0.67 - 1.17 mg/dL Final   2024 0.79 0.67 - 1.17 mg/dL Final   2024 0.59 (L) 0.67 - 1.17 mg/dL Final   2024 0.66 (L) 0.67 - 1.17 mg/dL Final   Blood Flow (Circuit) LPM: 4.22 LPM  Sweep LPM: 3 LPM  Sweep FiO2   %: 60 %  ACT  (seconds): 201 seconds  Arterial Blood Temp  (degrees C): 37.9 C  Pulse Oximetry  (SpO2%):  (orquidea)  Arterial Pressure  mmH mmHg    ECMO Issues including  assessments and plan on DOS 2/19/2024:  Neuro: Sedated for mechanical ventilation and ECMO.  No acute distress.  NIRS stable b/l  RASS goal: -3  CV: Cardiogenic shock.  Hemodynamically stable on dobutamine, epi, norepi, vaso  Pulm: Keep vent settings at rest settings as above.  FEN/Renal: Electrolytes stable w/ replacement protocols in place, Cr stable, UOP stable  Heme: ACT goal: 180-200, Hemoglobin stable.  Minimal oozing around the ECMO cannulas.  ID: Receiving empiric antibiotics  Cannulae: Position is acceptable on exam and the available imaging.  Distal perfusion cannula is in place and patent.  Extremities are well-perfused after IABP removed.     I have personally reviewed the ECMO flows, oxygenation and CO2 clearance, anticoagulation, and cannula position.  I have also personally assessed the patient's systemic response with hemodynamics, oxygenation, ventilation, and bleeding.       The patient requires continued ECMO support and management in the ICU.  I have discussed patient care and treatment plan with the primary team.      Yuri Toscano MD, PhD  Interventional/Critical Care Cardiology  [phone]    February 19, 2024

## 2024-02-19 NOTE — PROGRESS NOTES
[facility]         Intra-Aortic Balloon Pump Discontinuation:     IABP support discontinued 2/19/2024 at 0016. Confirmed balloon was to come out even though  pt had no pulsatility. Plan is to increase epi at this time.    Bakari Cat, RT  ECMO Specialist  2/19/2024 1:11 AM

## 2024-02-19 NOTE — PROGRESS NOTES
ECMO Attending Progress Note  2024    Alexi Delgadillo is a 63 year old male who was cannulated for ECMO 2024 due to refractory VF arrest    Cannulation Site:  17 Fr in the R femoral artery  25 Fr in the R femoral vein    Interval events: cannulated for refractory VF arrest, stabilized with ECMO flow    Pulsatilty (IABP paused if applicable): 5 mmHG     Physical Exam:  Temp:  [97.3  F (36.3  C)-98.8  F (37.1  C)] 97.9  F (36.6  C)  Pulse:  [101-122] 119  Resp:  [0-18] 6  BP: ()/() 89/73  FiO2 (%):  [60 %] 60 %  SpO2:  [96 %] 96 %    Intake/Output Summary (Last 24 hours) at 2024 1858  Last data filed at 2024 1800  Gross per 24 hour   Intake 407.56 ml   Output 750 ml   Net -342.44 ml    Vent Mode: CMV/AC  (Continuous Mandatory Ventilation/ Assist Control)  FiO2 (%): 60 %  Resp Rate (Set): 12 breaths/min  Tidal Volume (Set, mL): 450 mL  PEEP (cm H2O): 7 cmH2O  Resp: (!) 6       Labs:  Recent Labs   Lab 24  1506   O2PER 60  60  60      Recent Labs   Lab 24  1506   WBC 27.9*   HGB 10.8*     Creatinine   Date Value Ref Range Status   2024 0.66 (L) 0.67 - 1.17 mg/dL Final       Blood Flow (Circuit) LPM: 3.96 LPM  Sweep LPM: 5 LPM  Sweep FiO2   %: 100 %  ACT  (seconds): 166 seconds  Arterial Blood Temp  (degrees C): 36.4 C  Pulse Oximetry  (SpO2%): 100 %  Arterial Pressure  mmH mmHg      ECMO Issues including assessments and plan on DOS 2024:  Neuro: Sedated for mechanical ventilation and ECMO.  No acute distress.  NIRS stable b/l  RASS goal: -3  CV: Cardiogenic shock.  Hemodynamically stable on epi, norepi  Pulm: Keep vent settings at rest settings as above.  FEN/Renal: Electrolytes stable w/ replacement protocols in place, Cr stable, UOP stable  Heme: ACT goal: 180-200, Hemoglobin stable.  Minimal oozing around the ECMO cannulas.  ID: Receiving empiric antibiotics  Cannulae: Position is acceptable on exam and the available imaging.  Distal perfusion  cannula is in place and patent.  Extremities are well-perfused.     I have personally reviewed the ECMO flows, oxygenation and CO2 clearance, anticoagulation, and cannula position.  I have also personally assessed the patient's systemic response with hemodynamics, oxygenation, ventilation, and bleeding.       The patient requires continued ECMO support and management in the ICU.  I have discussed patient care and treatment plan with the primary team.      Yuri Toscano MD, PhD  Interventional/Critical Care Cardiology  [phone]    February 18, 2024

## 2024-02-19 NOTE — PROGRESS NOTES
Admitted/transferred from: Cath Lab  Reason for admission/transfer: ECMO  2 RN skin assessment: completed by Elle STEWART  Result of skin assessment and interventions/actions: see EMR- significant pre-existing wounds  Height, weight, drug calc weight: =  Patient belongings (see Flowsheet): rings placed in safe, pants/shirt thrown away as full of feces and cut in pieces from emergent Cath Lab procedures  MDRO education added to care planN/A  ?  Major Shift Events:  Arrived at 1600    Neuro: not following commands, EEG applied, Pupils = and reactive with pupillometer, sedation is off, not withdrawing, does have spontaneously movements of upper arms    CV: ST, Maps maintained >65 with levo,Epi & vaso, afebrile, unable to doppler BLE pulses-->team aware, BL US completed with very poor flow  IABP: 1:1, 100%- trending BPs off IABP as no A-Line is present  ECMO: 60% sweep of 5, cannulas slightly oozy in R groin, ecmo specialist aware  Chugging often- 4.5L of LR given, 25g of albumin given    Respi:CMV settings, 300/12/7/60%, no inline secretions    Gi: OG in place, LIS, Tons of stool output    Gu: olivares in place    Skin: Severe presser injuries to backside, WOC consulted, LLE mottled- team aware    Lines: IO to RLE  Triple Lumen CVC to L groin  RFA PIV    Gtts: Levo @ 0.4  Vaso @4  Epi @ 0.15  Insulin @ 5  Dobutamine @ 10  TKO @ 5 with Abx going through  Heparin through the circuit @ 710      Plan: monitor labs/lytes & hemodynamics     For vital signs and complete assessments, please see documentation flowsheets.

## 2024-02-19 NOTE — PROGRESS NOTES
Preliminary EEG report:    First 30 min of vEEG was reviewed.  There is a burst-suppression pattern with bursts of irregular theta-delta activities lasting 3-5 seconds with prolonged periods of generalized severe suppression of the background with approximately 90-95% suppression.  No epileptiform discharges or seizures.   Findings are consistent with severe diffuse nonspecific encephalopathy. Sedative drips could in part contribute to this finding.       Masha Cochran MD

## 2024-02-19 NOTE — CONSULTS
Ethics Consultation      Note: The purpose of this note, including any accompanying recommendation, is advisory only concerning ethical principles and considerations related to this case. Determination of appropriate patient care decisions is the responsibility of the clinical team in consultation with patients and their decision makers and relevant institutional policy. Legal and policy questions should be addressed with Risk Management.    Date: 2/19/2024     Time:  0930     Attending physician: Yuri Toscano     Consult requested by: Sneha Celestin     Reason for consult: decision-making in patient on ECMO s/p arrest who is currently unrepresented     Participants in Consult:        Sneha Celestin     Decisional capacity: none     Advance directive:  unknown     Health Care Agent: unnown     Code status: Full Code     Background: (Medical)       64yo male s/p VF arrest at [employer], cannulated onto ECMO approximately 18 hours ago, EEG showing diffuse encephalopathy    Per Sneha Celestin, patient with very poor prognosis     Social:       Lives in a home with others but no known contacts     Ethical Issue:      How to make medical decisions for an unrepresented patient?     Ethics Analysis:      It is my understanding that social work has been tirelessly trying to find a contact for Mr. Delgadillo.  It is not a requirement that a surrogate decision-maker be next of kin, rather any person who knows his goals and values and is willing to serve in this capacity can be the medical decision-maker.    First, regarding standards for surrogate decision making. The obligation to honor patient autonomy is institutionalized in some formal norms and practices, including instruments like advance directives. There is a well established hierarchy for decision making and patient autonomy. The first standard is to honor the choices, goals, and values of decisional patients- particularly a right to refuse unwanted treatment. If a patient  "is not decisional, then decisions should be made in following known and/or documented choices of the patient e.g. in an advance directive, or in following the substituted judgment of an appropriate surrogate, wherein the surrogate makes choices that are consistent with and reflect what the patient would have chosen (and not what the surrogate wants.) If there is no documentation or knowledge of patient goals/values, and no appropriate surrogate, than the final standard is the best interests standard. In this situation, it's my understanding that the patient is not decisional, there is no prior knowledge of the patients goals/values/choices, and no appropriate surrogate has yet been found. Therefore, it is ethically appropriate to rely on a best interests standard.     The best interests standard can be relied upon as a tool to make ethically defensible decisions in a particular situation with limited information and options. The president's [place] has some helpful language. The president's [place] notes that decision makers should seek the best care for incapacitated adults, AND that the best care does not always extend biological life. \"Ultimately, caregivers must compare the burdens, consequences, and potential complications of the treatment itself against the burdens, consequences, and potential complications of non-treatment; and they must compare the likely realities of life after treatment against the realities of life without treatment.\"      \"Best interest: a legal standard of caregiving for incompetent patients, defined by the courts in terms of what a \"reasonable person\" would decide in the same situation. A consideration of best interests generally attempts to weigh the burdens and benefits of treatment to the patient in his present condition, when no clear preferences of the patient can be determined.\"     See President's [place] on Bioethics ([place]). (2005). Taking care: Ethical caregiving in our aging " "society. Executive Office of the President. See also: YAAKOV Carranza. (2007). The best interests standard for incompetent or incapacitated persons of all ages. Journal of Law, Medicine & Ethics, 35(1), 187-196.    In the case of Mr. Delgadillo, if no surrogate decision maker can be found and medical decisins need to be made on his behalf,     Two general ethical obligations can be held and honored at the same time here:  1) The obligation not to perform interventions where the risks and burdens of treatment outweigh opportunities for benefit.   2) The obligation to manage a patient's symptoms.      The specific medical decisions must be made by the care team; however, to the extent it is in their judgment the risks/burdensof ECMO are greater than the benefits of continuing, it would be ethically defensible to discontinue ECMO support, while continuing to manage symptoms.      Recommendations:      Continue excellent social work attempts to locate friends, family, contacts who know his wishes and could make medical decisions on his behalf    In the event no one is located, it is reasonable for a medical team consensus decision on medica management    Please contact the service if we can be of any further assistance: Vocera: \"Clinical Ethics Consultation Service\" under [employer] sites or [employer] \"Ethics\" consult pager [phone].    Michaela Barba MD, MPH, [facility]  Co-chair, [facility] Ethics Committee  \Co-lead, [facility] Ethics Service    "

## 2024-02-19 NOTE — PLAN OF CARE
Goal Outcome Evaluation:           Overall Patient Progress: decliningOverall Patient Progress: declining         Major Shift Events: At 1900 left leg mottled with no pulses, MD aware. Able to decrease vasopressors initially. Dr. Salguero came and removed IABP at 1216 due to increased mottling. No pulsatility without IABP. Noted increased ST elevation, Dr. Salguero notified. Converted into a junctional rhythm and eventually to asystole. Dr. Salguero updated.  Plan: Attempting to locate family today. Will continue to monitor/assess and update MD as needed.  For vital signs and complete assessments, please see documentation flowsheets.

## 2024-02-19 NOTE — PROGRESS NOTES
EEG CLINICAL NEUROPHYSIOLOGY PRELIMINARY REPORT    Video EEG through approximately 6 AM today reviewed.  As best as can be determined from electronic medical record, patient not treated with sedative drips during this recording.  Severe suppression of background activity.  Rare bursts of 80 to 100  V delta, superimposed theta, and occasionally some sharp waveforms occur intermittently but occupy no more than 5% of the ongoing recording.  Sometimes these appear to be associated with the facial jerks.  No electrographic seizures.    Study consistent with a severe diffuse encephalopathy.  Burst suppression pattern is seen with bursts being rare and suppressed periods lengthy.  No seizures.    Full report to follow.    Sheng Fuentes MD  Contact information for physicians covering Epilepsy and EEG is available on [facility].  Click search, enter neurology adult/[facility] in group name box, click on neurology adult/[facility], then click Staff Epilepsy and EEG.

## 2024-02-20 NOTE — PROGRESS NOTES
EEG CLINICAL NEUROPHYSIOLOGY PRELIMINARY REPORT    Further decline in amplitude of infrequent bursts of electrocerebral activity over the last three hours, now with long periods in which no clear electrocerbral activity is seen even at high sensitivities (2 uV/mm). Discussed with neurocrit service.    Full report to follow.    Sheng Fuentes MD  Contact information for physicians covering Epilepsy and EEG is available on [facility].  Click search, enter neurology adult/[facility] in group name box, click on neurology adult/[facility], then click Staff Epilepsy and EEG.

## 2024-02-20 NOTE — PLAN OF CARE
Major Shift Events:    Neruo: No sedation. Pt eyes open. Bilateral Pupils fixed until early this morning when they developed npi of 3 per pupillometer. Did not appear reactive without pupillometer, but npi score recorded per NCC. No cough or gag.   Resp: PC-AC 40% 25/7. Unable to get SpO2 reading from pulse ox. Moderate ETT and oral secretions. LS coarse/dim.   Cardiac: Asystole. Art line in place with no pulsatility. Levo, vaso and epi titrated to keep MAP>65. No pusles. LLE blotchy/mottled/dusky, worsened throughout shift ( CICU notified). Finger tips beginning to turn dusky/dark. Afebrile.  ECMO: Sweep 3/ lpm 4.15-4.92/ rpm 3800/ 60%. 1L LR given for chugging.   GI/: OG to LIS. Stomach distended and taut with blotchy/mottled discoloration spreading up L side of abd. CICU notified.  Collins in place with minimal, dark, tea colored output (team aware). Rectal tube in in place with minimal output.    Skin: PI dressings C/D/I on sacrum/buttock. WOC orders placed on how to do dressing change.   Labs: AST and Lactate worsening. X2 units RBC given. Pt shifted x1 and given Kayexalate for elevated potassium. Calcium replaced x1.     Plan: Notify team of any acute changes.   For vital signs and complete assessments, please see documentation flowsheets.   Problem: Adult Inpatient Plan of Care  Goal: Optimal Comfort and Wellbeing  Outcome: Not Progressing

## 2024-02-20 NOTE — DEATH PRONOUNCEMENT
MD DEATH PRONOUNCEMENT    Called to pronounce Alexi Delgadillo dead.    Physical Exam: Unresponsive to noxious stimuli, Spontaneous respirations absent, Breath sounds absent, Carotid pulse absent, Heart sounds absent, Pupillary light reflex absent, and Corneal blink reflex absent    Patient was pronounced dead at 11:11 AM, 2024.    Preliminary Cause of Death: Obstructive coronary artery disease, anterior MI, out of hospital cardiac arrest, cardiogenic shock    Principal Problem:    Cardiac arrest (H)       Infectious disease present?: NO    Communicable disease present? (examples: HIV, chicken pox, TB, Ebola, CJD) :  NO    Multi-drug resistant organism present? (example: MRSA): NO    Please consider an autopsy if any of the following exist:  NO Unexpected or unexplained death during or following any dental, medical, or surgical diagnostic treatment procedures.   NO Death of mother at or up to seven days after delivery.     NO All  and pediatric deaths.     NO Death where the cause is sufficiently obscure to delay completion of the death certificate.   NO Deaths in which autopsy would confirm a suspected illness/condition that would affect surviving family members or recipients of transplanted organs.     The following deaths must be reported to the 's Office:  NO A death that may be due entirely or in part to any factors other than natural disease (recent surgery, recent trauma, suspected abuse/neglect).   NO A death that may be an accident, suicide, or homicide.     NO Any sudden, unexpected death in which there is no prior history of significant heart disease or any other condition associated with sudden death.   NO A death under suspicious, unusual, or unexpected circumstances.    NO Any death which is apparently due to natural causes but in which the  does not have a personal physician familiar with the patient s medical history, social, or environmental situation or the  circumstances of the terminal event.   NO Any death apparently due to Sudden Infant Death Syndrome.     NO Deaths that occur during, in association with, or as consequences of a diagnostic, therapeutic, or anesthetic procedure.   NO Any death in which a fracture of a major bone has occurred within the past (6) six months.   NO A death of persons note seen by their physician within 120 days of demise.     NO Any death in which the  was an inmate of a public institution or was in the custody of Law Enforcement personnel.   NO  All unexpected deaths of children   NO Solid organ donors   NO Unidentified bodies   NO Deaths of persons whose bodies are to be cremated or otherwise disposed of so that the bodies will later be unavailable for examination;   NO Deaths unattended by a physician outside of a licensed healthcare facility or licensed residential hospice program   NO Deaths occurring within 24 hours of arrival to a health care facility if death is unexpected.    NO Deaths associated with the decedent s employment.   NO Deaths attributed to acts of terrorism.   NO Any death in which there is uncertainty as to whether it is a medical examiner s care should be discussed with the medical investigator.        Body disposition: Autopsy was not discussed with family member:  No next of kin .  Permission for autopsy was Declined by medica staff due to know reason for cardiac arrest and death.

## 2024-02-20 NOTE — PROGRESS NOTES
[facility]    ECLS Shift Summary:     ECMO Equipment:  Console Serial Number: [id number]  Circuit Lot Number: [id number]  Oxygenator Lot Number: [id number]    Circuit Assessment: Fibrin  Fibrin Location: connectors    Arterial ECMO Cannula: 17 Fr in the Right Femoral Artery  Venous ECMO Cannula: 25 Fr in the Right Femoral Vein  ECMO Cannula Arterial Right femoral artery-Site Assessment: WDL  ECMO Cannula Venous Right femoral vein-Site Assessment: WDL  Distal Perfusion Catheter Right superficial femoral artery-Site Assessment: WDL  ECMO Cannula Arterial Right femoral artery-Site Intervention: No intervention needed  ECMO Cannula Venous Right femoral vein-Site Intervention: No intervention needed  Distal Perfusion Catheter Right superficial femoral artery-Site Intervention: No intervention needed    Patient remains on V-A ECMO, all equipment is functioning and alarms are appropriately set. RPM's: 3900 with Blood Flow (Circuit) LPM  Av.3 LPM  Min: 4.2 LPM  Max: 4.44 LPM L/min. Sweep is at 3 LPM and 60 %. Extremities are mottled, cool, pale .     Significant Shift Events: patient asystolic during the whole shift. Down to CCL for an attempt at TVP. Unable to pass wire and pace patient. Patient back up to room and bedside RN noted fixed pupils. Down to CT for head scan, pending results.     Vent settings:  Vent Mode: PCV Plus assist  (Pressure Control Ventilation/ Assist Control)  FiO2 (%): 40 %  Resp Rate (Set): 12 breaths/min  Tidal Volume (Set, mL): 300 mL  PEEP (cm H2O): 7 cmH2O  Inspiratory Pressure (cm H2O) (Drager Zakiya): 25  Resp: 12      Anticoagulation:  Dose (units/hr) HEParin: 700 Units/hr  Rate (mL/hr) HEParin: 7 mL/hr  Concentration HEParin: 100 Units/mL        Most recent: ACT  (seconds): 197 seconds    Urine output is  .  Blood loss was 0. Product given included 0.     Intake/Output Summary (Last 24 hours) at 2024 8364  Last data filed at 2024  1745  Gross per 24 hour   Intake 2922.54 ml   Output 666 ml   Net 2256.54 ml       Labs:  Recent Labs   Lab 02/19/24  1720 02/19/24  0812 02/19/24  0607 02/19/24  0359 02/19/24  0358 02/19/24  0205   PH  --  7.48* 7.39  --  7.51* 7.57*   PCO2  --  42 51*  --  34* 33*   PO2  --  280* 298*  --  289* 355*   HCO3  --  31* 31*  --  27 30*   O2PER 40  60 40 40 80  40 40 40       Lab Results   Component Value Date    HGB 7.0 (L) 02/19/2024    PHGB <30 02/19/2024     (L) 02/19/2024    FIBR 423 02/19/2024    INR 2.05 (H) 02/19/2024     (H) 02/19/2024    DD 12.33 (H) 02/19/2024    ANTCH 56 (L) 02/19/2024       Plan is to continue to support patient on VA ecmo until otherwise noted.    Hunter Rico RN  ECMO Specialist  2/19/2024 6:18 PM

## 2024-02-20 NOTE — DISCHARGE SUMMARY
[address] [address]  p: [phone]    Discharge Summary: Cardiology Service    Alexi Delgadillo MRN# [id number]   YOB: 1960 Age: 63 year old       Admission Date: 2024  Discharge Date: 24      Discharge Diagnoses:  1. Cerebral edema  2. Coronary artery disease  3. Cardiomyopathy  4. Acute hypoxic respiratory failure  5. Acute Renal failure  6. Lactic acidosis  7. Acute blood loss anemia    Pertinent Procedures:  1. ECMO cannulation  2. angiogram    Consults:  Ethics  Neurology  Palliative care  Social service  [facility]      Imaging with results:  Echocardiogram : Cardiac standstill, no clots noted in ventricle, Small circumferential pericardial effusion is present without any hemodynamic  significance.    Coronary Angiogram: CAD stent to LAD    Other imaging studies:  CT head: cerebral edema, new subacute Right occipital hypodensity    Brief HPI:  Riki Duarte (Elie Gonsales ( 1960) is a 63 year old M who was transported to [facility] emergently with ongoing SHELLEY CPR after out-of-hospital refractory VT/VF cardiac arrest at 11.35h in the [employer] [employer] store. Bystander CPR initiated. Received shock x 3, epi 1mg x2, amiodarone 30mg x 1, bicarb 1 amp without ROSC.  Over the night lost pulses in LLE with increased mottling of leg. Lost cardiac pulsatility, had ST elevations which converted to junctional rhythm and then to asystole. ECHO Completed and in cardiac stand still. Maintaining perfusion and arterial pressure with ECMO circiut flowing at 4.5 L and epinephrine, norepinephrine, vasopressin and dobutamine. ACT goal 180-200. Julio C hugger to legs     Hospital Course by Diagnosis:  # Concern for anoxic brain injury   # Refractory VF/ VT Cardiac arrest   # Cardiogenic shock requiring VA ECMO  # CAD 2 stents to p-mLAD  # Cardiomyopathy  # Dyslipidemia  # Acute hypoxemic respiratory  failure  # Aspiration pneumonia  # Shock liver 2/2 cardiac arrest  # Hypoalbuminemia   # At risk for protein malnutrition   # Acute Renal Injury   # Hyperkalemia  # Lactic acidosis  # Aspiration Pneumonia  # Leukocytosis  # acute blood loss anemia  # Stress induced leukocytosis  # Present on admission Bilateral IT Pressure Wounds Stage 3-4   # Present on admission Sacrum/Coccyx Pressure Wound Unstageble     Code status:  Comfort measures    Old friend Bill notified of death. He is going to contact [facility] charities to arrange burial.  will contact him and have him notify morgue of burial plans/ Good firen  Friend Kimberly also notified of death  Sneha Celestin DNP, C-NP  [facility]        No care team member to display

## 2024-02-20 NOTE — PLAN OF CARE
Major Shift Events:  Pt non-responsive, asystole. Comfort cares initiated @ 1105. ECMO circuit, ventilator, all medications discontinued per protocol. Pt declared dead @ 1111. See provider note

## 2024-02-20 NOTE — PROGRESS NOTES
[facility]    ECLS Shift Summary:     ECMO Equipment:  Console Serial Number: [id number]  Circuit Lot Number: [id number]  Oxygenator Lot Number: [id number]    Circuit Assessment: Fibrin  Fibrin Location: [facility]    Arterial ECMO Cannula: 17 Fr in the Right Femoral Artery  Venous ECMO Cannula: 25 Fr in the Right Femoral Vein    ECMO Cannula Arterial Right femoral artery-Site Assessment: WDL  ECMO Cannula Venous Right femoral vein-Site Assessment: WDL  Distal Perfusion Catheter Right superficial femoral artery-Site Assessment: WDL  ECMO Cannula Arterial Right femoral artery-Site Intervention: No intervention needed  ECMO Cannula Venous Right femoral vein-Site Intervention: No intervention needed  Distal Perfusion Catheter Right superficial femoral artery-Site Intervention: No intervention needed    Patient remains on V-A ECMO, all equipment is functioning and alarms are appropriately set. RPM's: 3800 with Blood Flow (Circuit) LPM  Av.5 LPM  Min: 4.15 LPM  Max: 4.92 LPM L/min. Sweep is at 3 LPM and 60 %. Extremities are cool, pale, and mottled (especially left leg and right fingers).    Significant Shift Events: Patient asystolic throughout shift. ECMO circuit chugging (see product given below). LLE mottled and progressively worsening.      Vent settings:  Vent Mode: PCV PLUS  (Pressure Control Ventilation Plus (added secondary Mode)  FiO2 (%): 40 %  Resp Rate (Set): 12 breaths/min  PEEP (cm H2O): 7 cmH2O  Inspiratory Pressure (cm H2O) (Drager Zakiya): 25  Resp: 12      Anticoagulation:  Dose (units/hr) HEParin: 0 Units/hr  Rate (mL/hr) HEParin: 0 mL/hr  Concentration HEParin: 100 Units/mL        Most recent: ACT  (seconds): 217 seconds    Urine output is  .  Blood loss was small. Product given included 2 unit of PRBCs.     Intake/Output Summary (Last 24 hours) at 2024 0510  Last data filed at 2024 0506  Gross per 24 hour   Intake 3062.45 ml   Output 518 ml    Net 2544.45 ml       Labs:  Recent Labs   Lab 02/20/24  0415 02/20/24  0410 02/20/24  0155 02/19/24  2347 02/19/24  2153   PH 7.38  --  7.46* 7.43 7.47*   PCO2 40  --  38 42 38   PO2 158*  --  147* 66* 112*   HCO3 24  --  27 28 28   O2PER 40 60  40 40 40 40       Lab Results   Component Value Date    HGB 8.4 (L) 02/19/2024    PHGB <30 02/19/2024     (L) 02/19/2024    FIBR 535 (H) 02/20/2024    INR 2.73 (H) 02/20/2024    PTT 67 (H) 02/20/2024    DD 5.25 (H) 02/20/2024    ANTCH 56 (L) 02/19/2024       Plan is to remain on VA ECMO support at this time, withdraw support soon?    Eulalia Love, RT  ECMO Specialist  2/20/2024 5:10 AM

## 2024-02-20 NOTE — PROGRESS NOTES
"Care Management Follow Up    Length of Stay (days): 2    Expected Discharge Date: 02/24/2024     Concerns to be Addressed: decision making  See note  Patient plan of care discussed at interdisciplinary rounds: Yes    Anticipated Discharge Disposition:  (TBD)     Anticipated Discharge Services:  (TBD)  Anticipated Discharge DME:  (TBD)    Patient/family educated on Medicare website which has current facility and service quality ratings:  (TBD)  Education Provided on the Discharge Plan: No  Patient/Family in Agreement with the Plan: unable to assess    Referrals Placed by CM/GENIE:    Private pay costs discussed: Not applicable    Additional Information:  SW received call back from Edwina Pretty from [facility] stating that the patient was no longer a resident. She stated patient has a brother but she had no contact information or name. She suggested I call jarret bailey as he moved there from [place]. GENIE called jarret bailey at [phone]. GENIE left voicemail asking for a return call.    GENIE received a call back from Nidia Bruno from [facility] on the Developmentally Disabled and Aging Waiver for [facility]. GENIE sent secure email to try and find out if she has information regarding next of kin.     GENIE asked private investigators for patient's history of marriages and found that he was . Writer has already attempted to reach his most recent ex-wife and has not been able to contact her.  Per  here are the patient's marriages    \"1.) KANNAN ZIEGLER (04/29/1989)  2.) KANNAN TOPETE (06/10/2002)  3.) KANNAN BARRERA (08/13/1976)\"    GENIE asked private investigators if they could try and find a name or contact information for patient's brother. Private investigators stated \"unfortunately I am unable to locate any known relatives to the patient. Due to his vagrancy, it is hard to find people connected to him.\"    GENIE searched [employer] and could not " locate any family tree information for patient. SW searched people search and could not find any family information for patient.     In review, the people who have/had a relationship with patient are    Juan Alberto, a retired mental health/substance use worker - [phone] that used to work with patient    Alcides [phone] . Patient recently moved into group home    Shi Wilde [phone] an exgirlfriend. She stated they broke up a while ago, but in the last few weeks he called her and they talked.     OSORIO Wellington, LGSW  4A & 4E ICU   Pager: [phone]  Phone: [phone]       OSORIO Tristan

## 2024-02-20 NOTE — PROGRESS NOTES
[facility]    ECLS Discontinuation Note:     ECLS was discontinued 2/20/2024 at 1110.    Marcellus Jaquez, RT  ECMO Specialist  2/20/2024 11:56 AM

## 2024-02-20 NOTE — CONSULTS
[facility] Nurse Inpatient Assessment     Consulted for: coccyx, bilateral glutes, ECMO pressure injury prevention   Addendum:  I was never able to assess wounds except from the photos taken by the bedside RN.  No history of these wounds in chart.    By photo, wound staging is:    Coccyx: unstageable  Right and Left IT:  both 3 vs 4    Patient History (according to provider note(s):      63 year old M who was transported to [facility] emergently with ongoing SHELLEY CPR after out-of-hospital refractory VT/VF cardiac arrest Found to have acute NSTEMI, now s/p HUI x 2 to prox to mid LAD. EEG showing diffuse encephalopathy. 2/18 overnight night lost pulses in LLE with increased mottling of leg. Lost cardiac pulsatility, had ST elevations which converted to junctional rhythm and then to asystole. ECHO Completed and in cardiac stand still. Maintaining perfusion and arterial pressure with ECMO circiut flowing at 4.5 L and epinephrine, norepinephrine, vasopressin and dobutamine.      Assessment:      Areas visualized during today's visit:  unable-asked by bedside RN to delay visit, then pt in heart cath lab    ECMO cannula location:   Arterial ECMO Cannula: 17 Fr in the Right Femoral Artery  Venous ECMO Cannula: 25 Fr in the Right Femoral Vein  Positioning tolerance: Poor  Date of ECMO cannulation: 2/18  Presence of ischemia: Yes  Location of ischemia: LLE  Pressure Injury Present::Yes  Pressure Injury Prevention Interventions In Place:  Optifoam Dressing under ECMO Cannula, Z flow Positioner under head, TAPs Wedge Positioners in use, Heel off-loading boots, and Mepilex Sacral Dressing     Wounds below present on admission.  photos taken by admitting bedside RN on 2/18 2/18: coccyx wound     2/18 Left IT    2/18 Right IT    Wounds currently with dry gauze packing      Pressure Injury Prevention Interventions Added:  None      Treatment Plan:     ECMO pressure injury  prevention plan:      Reposition patient every 1-2 hours using TAP Wedges  Position head on Z flow positioner, mold indentation at areas of pressure points.  Pad ECMO groin under rigid connectors with Optifoam or Soft cloth  Heel off-loading Boots at all times  Sacral Mepilex for Prevention, change every 5 days and prn  Low Air loss mattress      Bilateral IT and sacral/coccyx wound(s): Daily   -cleanse with microklenz spray and gauze pat dry   -pack with kerlix that has been lightly moistened with sterile NS  -cover with mepilex flex dressings.       Orders: Written    Discussed plan of care with: Nurse  [facility] nurse follow-up plan:  will reattempt visit tomm   Notify [facility] if wound(s) deteriorate.  Nursing to notify the Provider(s) and re-consult the [facility] Nurse if new skin concern.    DATA:     Current support surface: Standard  Low air loss (KENDAL pump, Isolibrium, Pulsate, skin guard, etc)  Containment of urine/stool: Incontinent pad in bed, Indwelling catheter, and Internal fecal management  BMI: Body mass index is 26.05 kg/m .   Active diet order: Orders Placed This Encounter      NPO for Medical/Clinical Reasons Except for: No Exceptions     Output: I/O last 3 completed shifts:  In: 7678.47 [I.V.:6580.97; NG/GT:435]  Out: 1416 [Urine:666; Emesis/NG output:500; Stool:250]     Labs:   Recent Labs   Lab 02/19/24  1720 02/18/24  2157 02/18/24  1806   ALBUMIN 3.8   < >  --    HGB 7.0*   < >  --    INR 2.16*   < >  --    WBC 23.4*   < >  --    A1C  --   --  7.2*    < > = values in this interval not displayed.     Pressure injury risk assessment:   Sensory Perception: 1-->completely limited  Moisture: 3-->occasionally moist  Activity: 1-->bedfast  Mobility: 1-->completely immobile  Nutrition: 2-->probably inadequate  Friction and Shear: 1-->problem  Adria Score: 9    Pager no longer is use, please contact through [facility]: [facility] Nurse [place]  Dept. Office Number: [phone]

## 2024-02-20 NOTE — PROGRESS NOTES
Versed not populating in pt chart as a medication to waste in pyxis. Medication manually entered/searched. 60 ml of versed wasted with SUKH Mendez

## 2024-02-20 NOTE — PROGRESS NOTES
ECMO Attending Progress Note  2024    Alexi Delgadillo is a 63 year old male who was cannulated for ECMO 2024 due to refractory VF arrest    Cannulation Site:  17 Fr in the R femoral artery  25 Fr in the R femoral vein    Interval events: ongoing asystole, unable to place a pacer yesterday that would capture, plan for comfort care today    Pulsatilty (IABP paused if applicable): 0 mmHG     Physical Exam:  Temp:  [97.7  F (36.5  C)-99.3  F (37.4  C)] 99  F (37.2  C)  Pulse:  [0] 0  Resp:  [12] 12  MAP:  [63 mmHg-102 mmHg] 77 mmHg  Arterial Line BP: ()/(65-95) 68/65  FiO2 (%):  [40 %-50 %] 40 %  SpO2:  [0 %-100 %] 93 %    Intake/Output Summary (Last 24 hours) at 2024 1121  Last data filed at 2024 1000  Gross per 24 hour   Intake 3154.32 ml   Output 717 ml   Net 2437.32 ml      Vent Mode: PCV PLUS  (Pressure Control Ventilation Plus (added secondary Mode)  FiO2 (%): 40 %  Resp Rate (Set): 12 breaths/min  Tidal Volume (Set, mL): 300 mL  PEEP (cm H2O): 7 cmH2O  Inspiratory Pressure (cm H2O) (Drager Zakiya): 25  Resp: 12       Labs:  Recent Labs   Lab 24  1011 24  0806 24  0550 24  0415   PH 7.35 7.31* 7.34* 7.38   PCO2 40 48* 44 40   PO2 151* 112* 118* 158*   HCO3  24  24   O2PER 50 40 40 40      Recent Labs   Lab 24  1012 24  0410 24  2152 24  1720   WBC 18.0* 20.7* 21.3* 23.4*   HGB 8.2* 6.9* 8.4* 7.0*     Creatinine   Date Value Ref Range Status   2024 1.46 (H) 0.67 - 1.17 mg/dL Final   2024 1.29 (H) 0.67 - 1.17 mg/dL Final   2024 1.17 0.67 - 1.17 mg/dL Final   2024 0.91 0.67 - 1.17 mg/dL Final   Blood Flow (Circuit) LPM: 4.01 LPM  Sweep LPM: 3.5 LPM  Sweep FiO2   %: 60 %  ACT  (seconds): 217 seconds  Arterial Blood Temp  (degrees C): 37 C  Pulse Oximetry  (SpO2%): 213 %  Arterial Pressure  mmH mmHg    ECMO Issues including assessments and plan on DOS 2024:  Neuro: Sedated for mechanical ventilation and  ECMO.  No acute distress.  NIRS stable b/l  RASS goal: -3  CV: Cardiogenic shock.  Hemodynamically stable on dobutamine, epi, norepi, vaso  Pulm: Keep vent settings at rest settings as above.  FEN/Renal: Electrolytes stable w/ replacement protocols in place, Cr stable, UOP stable  Heme: ACT goal: 180-200, Hemoglobin stable.  Minimal oozing around the ECMO cannulas.  ID: Receiving empiric antibiotics  Cannulae: Position is acceptable on exam and the available imaging.  Distal perfusion cannula is in place and patent.  Extremities are well-perfused after IABP removed.     I have personally reviewed the ECMO flows, oxygenation and CO2 clearance, anticoagulation, and cannula position.  I have also personally assessed the patient's systemic response with hemodynamics, oxygenation, ventilation, and bleeding.       The patient requires continued ECMO support and management in the ICU.  I have discussed patient care and treatment plan with the primary team.      Yuri Toscano MD, PhD  Interventional/Critical Care Cardiology  [phone]    February 20, 2024

## 2024-02-20 NOTE — PROGRESS NOTES
Significant events: Down to CCL for TVP- unable to pass wire to pace pt, Head CT completed    Neuro: opens eyes, no tracking, sedation off, no withdrawal in any extremities, no cough/gag    CV:   Rate/rhythm:  asystole  Mechanical:  Flows 4-4.5, sweep is 3L & 60%     Pulm:  PC 40% 25/7, no inline secretions    GI: RT in place with minimal OP, OG in place to LIS no output this shift,     : UOP minimal- team aware     Skin: tunneling butt wounds, LLE mottled    Drains: OG, Collins, Rectal Tube    Drips:   Levo 0.2  Epi 0.15  Vaso 1  Dobutamine 10  Insulin 2    Labs:   Hgb 7.0 --blood ordered  K+ 5.7- team notified  Ical 4.1, 1gram fiven    Plan: GOC/ continue on VA ecmo      For vital signs and complete assessments, please see documentation flowsheets.      none

## 2024-02-21 LAB
BACTERIA SPT CULT: ABNORMAL
GRAM STAIN RESULT: ABNORMAL
GRAM STAIN RESULT: ABNORMAL

## 2024-02-22 LAB
ATRIAL RATE - MUSE: 2 BPM
DIASTOLIC BLOOD PRESSURE - MUSE: NORMAL MMHG
INTERPRETATION ECG - MUSE: NORMAL
NSE SERPL IA-MCNC: 325.6 NG/ML
P AXIS - MUSE: NORMAL DEGREES
PR INTERVAL - MUSE: NORMAL MS
QRS DURATION - MUSE: 4 MS
QT - MUSE: 480 MS
QTC - MUSE: 87 MS
R AXIS - MUSE: 0 DEGREES
SYSTOLIC BLOOD PRESSURE - MUSE: NORMAL MMHG
T AXIS - MUSE: 267 DEGREES
VENTRICULAR RATE- MUSE: 2 BPM

## 2024-02-23 LAB
BACTERIA BLD CULT: NO GROWTH
BACTERIA SPT CULT: ABNORMAL
GRAM STAIN RESULT: ABNORMAL

## 2024-02-24 LAB — BACTERIA BLD CULT: NO GROWTH

## 2024-02-25 LAB — BACTERIA BLD CULT: NO GROWTH

## 2024-02-28 LAB
7AMINOCLONAZEPAM SERPL-MCNC: NEGATIVE NG/ML
ALPRAZ SERPL-MCNC: NEGATIVE NG/ML
BENZODIAZ SPEC QL: POSITIVE
CHLORDIAZEP SERPL-MCNC: NEGATIVE NG/ML
CLONAZEPAM SERPL-MCNC: NEGATIVE NG/ML
DESALKYLFLURAZ SERPL CFM-MCNC: NEGATIVE NG/ML
DIAZEPAM SERPL-MCNC: NEGATIVE NG/ML
FLURAZEPAM SPEC-MCNC: NEGATIVE NG/ML
LORAZEPAM SERPL-MCNC: NEGATIVE NG/ML
MIDAZOLAM SERPL-MCNC: 461.8 NG/ML
NORCHLORDIAZEP SERPL-MCNC: NEGATIVE UG/ML
NORDIAZEPAM SPEC-MCNC: NEGATIVE NG/ML
OXAZEPAM SERPL CFM-MCNC: NEGATIVE NG/ML
TEMAZEPAM SERPL-MCNC: NEGATIVE NG/ML
TRIAZOLAM SPEC-MCNC: NEGATIVE NG/ML

## 2024-03-04 LAB
AMPHET BLD CFM-MCNC: NEGATIVE NG/ML
APAP BLD-MCNC: NEGATIVE UG/ML
BARBITURATES SPEC-MCNC: NEGATIVE UG/ML
BENZODIAZ SPEC-MCNC: ABNORMAL NG/ML
BUPRENORPHINE SERPL-MCNC: NEGATIVE NG/ML
BZE BLD CFM-MCNC: NEGATIVE NG/ML
CARBOXYTHC BLD-MCNC: NEGATIVE NG/ML
CARISOPRODOL IA: NEGATIVE UG/ML
DECLARED MEDICATIONS: ABNORMAL
DRUGS BLD SCN NOM: ABNORMAL
DRUGS FLD: ABNORMAL
ETHANOL BLD-MCNC: NEGATIVE GM/DL
FENTANYL IA: NEGATIVE NG/ML
GABAPENTIN IA: NEGATIVE UG/ML
MEPERIDINE SERPLBLD-MCNC: NEGATIVE NG/ML
METHADONE SAL CFM-MCNC: NEGATIVE NG/ML
OPIATES SPEC-MCNC: NEGATIVE NG/ML
OXYCODONE SERPLBLD SCN-MCNC: NEGATIVE NG/ML
PCP SPEC-MCNC: NEGATIVE NG/ML
PROPOXYPH SPEC-MCNC: NEGATIVE NG/ML
TRAMADOL BLD-MCNC: NEGATIVE NG/ML

## 2024-03-06 LAB
S100 CA BINDING PROTEIN B SER-MCNC: 1430 NG/L
S100 CA BINDING PROTEIN B SER-MCNC: 1435 NG/L
S100 CA BINDING PROTEIN B SER-MCNC: 1883 NG/L

## 2024-03-22 ENCOUNTER — TELEPHONE (OUTPATIENT)
Dept: CARE COORDINATION | Facility: CLINIC | Age: 64
End: 2024-03-22
Payer: COMMERCIAL

## 2024-03-22 NOTE — PROGRESS NOTES
Care Management Follow Up  Additional Information:  Spoke with Martha again today - she has reached out to [place]  [facility] and they will help her with the arrangements. She feels able with help to make the arrangements and wants to make sure that she is able to do what Alexi would have wanted. She has already spoken with the  home and will plan to call Burial Assistance in [facility]     Health and Wellness Service Team  Financial Assistance Services  Burial Specialist  Phone [phone]  Fax: [phone]-     she supplied the following information:    Her address:  [address] [place] [address]    Phone:  [phone]     Home:  [place] on [address]       Email:  [email or web address]    Will mail out BETTY form to wife so she is able to request medical records that she wants.     Keri Cueto, GRETCHEN  3/22/2024   Supervisor  [phone]

## 2024-03-22 NOTE — PROGRESS NOTES
Care Management Follow Up    Additional Information: Late Entry from 3.20.24    We are working to secure a family member for final disposition of patient's body (he has been in the morgue since his death while we searched for legal NOK) and belongings here at the hospital. We received a call from an individual named Martha Delgadillo ([phone]) stating that she was Alexi's wife - this matches information charted in Conchita [facility]'s note from the  we hired to find NOK.     Writer spoke with Martha Delgadillo for about 28 minutes. She stated multiple times that she is  still legally his wife.  She shared that she has been his wife since they   a long time ago  - when writer asked for the date she replied  my memory is not that good  and  me and Elmer had a hard life.  (she referred to patient as  Elmer ).      Martha Delgadillo has a son that she stated was patient s step-son. Her son and son s girlfriend are her main sources of support.     She last saw patient about 2 months ago when they went out for coffee. She did see him 1 time when she was boarding a city bus with her son and his girlfriend. Patient was in his WC and shared that  they want me to go to an assisted living  due to getting (what sounds like) sepsis from a wound. Martha Delgadillo encouraged him to go to the Assisted Living and then they parted ways.     She shared that she learned about his collapse at the [employer] and death from a friend of patient s called  [employer]  and then Martha Gilbertson called the hospital.      Martha Delgadillo is not sure that she could be responsible for the disposition of his body (she did share that he had expressed a wish to be cremated) stating that she does not have the financial ability to pay for anything herself. Writer shared multiple times that there was assistance through the [facility] and all she would need to do would be to identify a  home and work with them to help with the arrangements,  fill out applications, etc. . . Writer suggested she talk with her son to see if he could help her as well. She said she will ask and call writer back.    Keri Cueto [facility]    [facility]  [phone]

## (undated) DEVICE — KIT HAND CONTROL ACIST 016795

## (undated) DEVICE — INTRO SHEATH 8FRX10CM PINNACLE RSS802

## (undated) DEVICE — INTRO SHEATH 6FRX25CM PINNACLE RSS606

## (undated) DEVICE — PACK HEART LEFT CUSTOM

## (undated) DEVICE — SLEEVE REPOSITIONING W/CATH LOCK 60CM 406503

## (undated) DEVICE — CATH GUIDING BLUE YELLOW PTFE JL4 6FRX100CM 67000400

## (undated) DEVICE — WIRE GUIDE HI-TRQ PILOT 50 JTIP 0.014"X190CM 1010480-HJ

## (undated) DEVICE — VALVE HEMOSTASIS .096" COPILOT MECH 1003331

## (undated) DEVICE — PACK HEART RIGHT CUSTOM SAN32RHF18

## (undated) DEVICE — KIT DVC ANGIO IBASIXCOMPAK 13INX20ML 3WAY IN4430

## (undated) DEVICE — GUIDEWIRE VASCULAR 0.035IN DIA 145CML 15CML/6CML STAINLESS

## (undated) DEVICE — WIRE GUIDE HI-TRQ  WHISPER MS JTIP 0.014"X190CM 1005357HJ

## (undated) DEVICE — LEAD PACEMAKER SELECTSECURE 69CM MD  383069
Type: IMPLANTABLE DEVICE | Status: NON-FUNCTIONAL
Removed: 2024-02-19

## (undated) DEVICE — DEVICE STATLOCK INTRA-AORTIC BALL PUMP 0684-00-0472

## (undated) DEVICE — CATH GUIDING BLUE YELLOW PTFE XB3.5 6FRX100CM 67005400

## (undated) DEVICE — CATH BALLOON EMERGE 2.0X20MM H7493918920200

## (undated) DEVICE — IMP LEAD PACING BIPOLAR CAPSUREFIX NOVUS 65CM 5076-65
Type: IMPLANTABLE DEVICE | Status: NON-FUNCTIONAL
Removed: 2024-02-19

## (undated) DEVICE — INTRODUCER SHEATH FAST-CATH CATH-LOCK 8FRX12CM 406706

## (undated) DEVICE — SLITTER ADJSTBL 6232ADJ

## (undated) DEVICE — WIRE GUIDE 0.035"X145CM AMPLATZ XSTIFF J THSCF-35-145-3-AES

## (undated) DEVICE — INTRO SHEATH MICRO PLATINUM TIP 4FRX40CM 7274

## (undated) DEVICE — INTRO SHEATH 9FRX10CM PINNACLE RSS902

## (undated) DEVICE — TUBING PRESSURE 30"

## (undated) DEVICE — CATH GD 5.4FR ID / 7FR OD X 43

## (undated) DEVICE — CABLE PCNG 12 FT MEDTRONIC PAT

## (undated) DEVICE — CATH ANGIO SUPERTORQUE PLUS JR4 6FRX100CM 533621

## (undated) DEVICE — INTRO SHTH INTRO SHTH 8FR X 11

## (undated) DEVICE — GLIDEWIRE TERUMO .035X180CM 1.5,, J-TIP GR3525

## (undated) RX ORDER — MIDAZOLAM HCL IN 0.9 % NACL/PF 1 MG/ML
PLASTIC BAG, INJECTION (ML) INTRAVENOUS
Status: DISPENSED
Start: 2024-01-01

## (undated) RX ORDER — ASPIRIN 325 MG
TABLET ORAL
Status: DISPENSED
Start: 2024-01-01

## (undated) RX ORDER — FENTANYL CITRATE 50 UG/ML
INJECTION, SOLUTION INTRAMUSCULAR; INTRAVENOUS
Status: DISPENSED
Start: 2024-01-01

## (undated) RX ORDER — NOREPINEPHRINE BITARTRATE 0.06 MG/ML
INJECTION, SOLUTION INTRAVENOUS
Status: DISPENSED
Start: 2024-01-01

## (undated) RX ORDER — POTASSIUM CHLORIDE 29.8 MG/ML
INJECTION INTRAVENOUS
Status: DISPENSED
Start: 2024-01-01